# Patient Record
Sex: FEMALE | Race: WHITE | Employment: UNEMPLOYED | URBAN - METROPOLITAN AREA
[De-identification: names, ages, dates, MRNs, and addresses within clinical notes are randomized per-mention and may not be internally consistent; named-entity substitution may affect disease eponyms.]

---

## 2021-04-21 ENCOUNTER — HOSPITAL ENCOUNTER (INPATIENT)
Age: 36
LOS: 5 days | Discharge: HOME | DRG: 561 | End: 2021-04-26
Attending: OBSTETRICS & GYNECOLOGY | Admitting: OBSTETRICS & GYNECOLOGY
Payer: MEDICAID

## 2021-04-21 PROBLEM — O09.33 NO PRENATAL CARE IN CURRENT PREGNANCY IN THIRD TRIMESTER: Status: ACTIVE | Noted: 2021-04-21

## 2021-04-21 LAB
ABO/RH: NORMAL
ALBUMIN SERPL-MCNC: 2.9 G/DL (ref 3.5–5.2)
ALP BLD-CCNC: 179 U/L (ref 35–104)
ALT SERPL-CCNC: 16 U/L (ref 0–32)
AMPHETAMINE SCREEN, URINE: POSITIVE
ANION GAP SERPL CALCULATED.3IONS-SCNC: 8 MMOL/L (ref 7–16)
ANTIBODY SCREEN: NORMAL
APTT: 25.8 SEC (ref 24.5–35.1)
AST SERPL-CCNC: 27 U/L (ref 0–31)
BACTERIA: ABNORMAL /HPF
BARBITURATE SCREEN URINE: NOT DETECTED
BASOPHILS ABSOLUTE: 0.06 E9/L (ref 0–0.2)
BASOPHILS RELATIVE PERCENT: 0.5 % (ref 0–2)
BENZODIAZEPINE SCREEN, URINE: NOT DETECTED
BILIRUB SERPL-MCNC: <0.2 MG/DL (ref 0–1.2)
BILIRUBIN URINE: NEGATIVE
BLOOD, URINE: ABNORMAL
BUN BLDV-MCNC: 15 MG/DL (ref 6–20)
CALCIUM SERPL-MCNC: 8.5 MG/DL (ref 8.6–10.2)
CANNABINOID SCREEN URINE: POSITIVE
CHLORIDE BLD-SCNC: 102 MMOL/L (ref 98–107)
CLARITY: CLEAR
CO2: 23 MMOL/L (ref 22–29)
COCAINE METABOLITE SCREEN URINE: POSITIVE
COLOR: YELLOW
CREAT SERPL-MCNC: 0.7 MG/DL (ref 0.5–1)
EOSINOPHILS ABSOLUTE: 0.15 E9/L (ref 0.05–0.5)
EOSINOPHILS RELATIVE PERCENT: 1.2 % (ref 0–6)
FENTANYL SCREEN, URINE: POSITIVE
GFR AFRICAN AMERICAN: >60
GFR NON-AFRICAN AMERICAN: >60 ML/MIN/1.73
GLUCOSE BLD-MCNC: 145 MG/DL (ref 74–99)
GLUCOSE URINE: 100 MG/DL
HBA1C MFR BLD: 4.8 % (ref 4–5.6)
HCT VFR BLD CALC: 35 % (ref 34–48)
HEMOGLOBIN: 11.8 G/DL (ref 11.5–15.5)
IMMATURE GRANULOCYTES #: 0.05 E9/L
IMMATURE GRANULOCYTES %: 0.4 % (ref 0–5)
INR BLD: 0.9
KETONES, URINE: NEGATIVE MG/DL
LEUKOCYTE ESTERASE, URINE: ABNORMAL
LV EF: 63 %
LVEF MODALITY: NORMAL
LYMPHOCYTES ABSOLUTE: 2.35 E9/L (ref 1.5–4)
LYMPHOCYTES RELATIVE PERCENT: 18.6 % (ref 20–42)
Lab: ABNORMAL
MAGNESIUM: 6 MG/DL (ref 1.6–2.6)
MCH RBC QN AUTO: 28.6 PG (ref 26–35)
MCHC RBC AUTO-ENTMCNC: 33.7 % (ref 32–34.5)
MCV RBC AUTO: 84.7 FL (ref 80–99.9)
METHADONE SCREEN, URINE: NOT DETECTED
MONOCYTES ABSOLUTE: 0.61 E9/L (ref 0.1–0.95)
MONOCYTES RELATIVE PERCENT: 4.8 % (ref 2–12)
NEUTROPHILS ABSOLUTE: 9.41 E9/L (ref 1.8–7.3)
NEUTROPHILS RELATIVE PERCENT: 74.5 % (ref 43–80)
NITRITE, URINE: NEGATIVE
OPIATE SCREEN URINE: POSITIVE
OXYCODONE URINE: NOT DETECTED
PDW BLD-RTO: 13.2 FL (ref 11.5–15)
PH UA: 6 (ref 5–9)
PHENCYCLIDINE SCREEN URINE: NOT DETECTED
PLATELET # BLD: 251 E9/L (ref 130–450)
PMV BLD AUTO: 10.9 FL (ref 7–12)
POTASSIUM SERPL-SCNC: 3.7 MMOL/L (ref 3.5–5)
PROTEIN UA: >=300 MG/DL
PROTHROMBIN TIME: 9.7 SEC (ref 9.3–12.4)
RAPID HIV 1&2: NORMAL
RBC # BLD: 4.13 E12/L (ref 3.5–5.5)
RBC UA: ABNORMAL /HPF (ref 0–2)
SODIUM BLD-SCNC: 133 MMOL/L (ref 132–146)
SPECIFIC GRAVITY UA: >=1.03 (ref 1–1.03)
TOTAL PROTEIN: 5.7 G/DL (ref 6.4–8.3)
UROBILINOGEN, URINE: 0.2 E.U./DL
WBC # BLD: 12.6 E9/L (ref 4.5–11.5)
WBC UA: ABNORMAL /HPF (ref 0–5)

## 2021-04-21 PROCEDURE — 99253 IP/OBS CNSLTJ NEW/EST LOW 45: CPT | Performed by: INTERNAL MEDICINE

## 2021-04-21 PROCEDURE — G0480 DRUG TEST DEF 1-7 CLASSES: HCPCS

## 2021-04-21 PROCEDURE — 86850 RBC ANTIBODY SCREEN: CPT

## 2021-04-21 PROCEDURE — 86901 BLOOD TYPING SEROLOGIC RH(D): CPT

## 2021-04-21 PROCEDURE — 93005 ELECTROCARDIOGRAM TRACING: CPT | Performed by: INTERNAL MEDICINE

## 2021-04-21 PROCEDURE — 80053 COMPREHEN METABOLIC PANEL: CPT

## 2021-04-21 PROCEDURE — 2580000003 HC RX 258: Performed by: OBSTETRICS & GYNECOLOGY

## 2021-04-21 PROCEDURE — 85730 THROMBOPLASTIN TIME PARTIAL: CPT

## 2021-04-21 PROCEDURE — 85610 PROTHROMBIN TIME: CPT

## 2021-04-21 PROCEDURE — 86803 HEPATITIS C AB TEST: CPT

## 2021-04-21 PROCEDURE — 80307 DRUG TEST PRSMV CHEM ANLYZR: CPT

## 2021-04-21 PROCEDURE — 2500000003 HC RX 250 WO HCPCS

## 2021-04-21 PROCEDURE — 99254 IP/OBS CNSLTJ NEW/EST MOD 60: CPT | Performed by: STUDENT IN AN ORGANIZED HEALTH CARE EDUCATION/TRAINING PROGRAM

## 2021-04-21 PROCEDURE — 86900 BLOOD TYPING SEROLOGIC ABO: CPT

## 2021-04-21 PROCEDURE — 88307 TISSUE EXAM BY PATHOLOGIST: CPT

## 2021-04-21 PROCEDURE — 6360000002 HC RX W HCPCS: Performed by: INTERNAL MEDICINE

## 2021-04-21 PROCEDURE — 83735 ASSAY OF MAGNESIUM: CPT

## 2021-04-21 PROCEDURE — 93306 TTE W/DOPPLER COMPLETE: CPT

## 2021-04-21 PROCEDURE — 86592 SYPHILIS TEST NON-TREP QUAL: CPT

## 2021-04-21 PROCEDURE — 6370000000 HC RX 637 (ALT 250 FOR IP): Performed by: STUDENT IN AN ORGANIZED HEALTH CARE EDUCATION/TRAINING PROGRAM

## 2021-04-21 PROCEDURE — 6360000002 HC RX W HCPCS

## 2021-04-21 PROCEDURE — 36415 COLL VENOUS BLD VENIPUNCTURE: CPT

## 2021-04-21 PROCEDURE — 6370000000 HC RX 637 (ALT 250 FOR IP): Performed by: INTERNAL MEDICINE

## 2021-04-21 PROCEDURE — 86762 RUBELLA ANTIBODY: CPT

## 2021-04-21 PROCEDURE — 2500000003 HC RX 250 WO HCPCS: Performed by: OBSTETRICS & GYNECOLOGY

## 2021-04-21 PROCEDURE — 81001 URINALYSIS AUTO W/SCOPE: CPT

## 2021-04-21 PROCEDURE — 83036 HEMOGLOBIN GLYCOSYLATED A1C: CPT

## 2021-04-21 PROCEDURE — 86701 HIV-1ANTIBODY: CPT

## 2021-04-21 PROCEDURE — 87340 HEPATITIS B SURFACE AG IA: CPT

## 2021-04-21 PROCEDURE — 6360000002 HC RX W HCPCS: Performed by: OBSTETRICS & GYNECOLOGY

## 2021-04-21 PROCEDURE — 2060000000 HC ICU INTERMEDIATE R&B

## 2021-04-21 PROCEDURE — 85025 COMPLETE CBC W/AUTO DIFF WBC: CPT

## 2021-04-21 RX ORDER — HYDRALAZINE HYDROCHLORIDE 20 MG/ML
10 INJECTION INTRAMUSCULAR; INTRAVENOUS
Status: DISCONTINUED | OUTPATIENT
Start: 2021-04-21 | End: 2021-04-25 | Stop reason: ALTCHOICE

## 2021-04-21 RX ORDER — DOCUSATE SODIUM 100 MG/1
100 CAPSULE, LIQUID FILLED ORAL 2 TIMES DAILY
Status: DISCONTINUED | OUTPATIENT
Start: 2021-04-21 | End: 2021-04-21 | Stop reason: SDUPTHER

## 2021-04-21 RX ORDER — ACETAMINOPHEN 325 MG/1
650 TABLET ORAL EVERY 4 HOURS PRN
Status: DISCONTINUED | OUTPATIENT
Start: 2021-04-21 | End: 2021-04-21 | Stop reason: SDUPTHER

## 2021-04-21 RX ORDER — MAGNESIUM SULFATE IN WATER 40 MG/ML
INJECTION, SOLUTION INTRAVENOUS
Status: DISPENSED
Start: 2021-04-21 | End: 2021-04-21

## 2021-04-21 RX ORDER — ACETAMINOPHEN 500 MG
1000 TABLET ORAL ONCE
Status: COMPLETED | OUTPATIENT
Start: 2021-04-21 | End: 2021-04-21

## 2021-04-21 RX ORDER — SODIUM CHLORIDE 0.9 % (FLUSH) 0.9 %
5-40 SYRINGE (ML) INJECTION EVERY 12 HOURS SCHEDULED
Status: DISCONTINUED | OUTPATIENT
Start: 2021-04-21 | End: 2021-04-21 | Stop reason: SDUPTHER

## 2021-04-21 RX ORDER — SODIUM CHLORIDE 0.9 % (FLUSH) 0.9 %
5-40 SYRINGE (ML) INJECTION PRN
Status: DISCONTINUED | OUTPATIENT
Start: 2021-04-21 | End: 2021-04-21 | Stop reason: HOSPADM

## 2021-04-21 RX ORDER — LABETALOL HYDROCHLORIDE 5 MG/ML
40 INJECTION, SOLUTION INTRAVENOUS
Status: COMPLETED | OUTPATIENT
Start: 2021-04-21 | End: 2021-04-21

## 2021-04-21 RX ORDER — ACETAMINOPHEN 650 MG
TABLET, EXTENDED RELEASE ORAL
Status: DISPENSED
Start: 2021-04-21 | End: 2021-04-21

## 2021-04-21 RX ORDER — ACETAMINOPHEN 325 MG/1
650 TABLET ORAL EVERY 4 HOURS PRN
Status: DISCONTINUED | OUTPATIENT
Start: 2021-04-21 | End: 2021-04-21 | Stop reason: HOSPADM

## 2021-04-21 RX ORDER — LABETALOL HYDROCHLORIDE 5 MG/ML
10 INJECTION, SOLUTION INTRAVENOUS EVERY 4 HOURS PRN
Status: DISCONTINUED | OUTPATIENT
Start: 2021-04-21 | End: 2021-04-25

## 2021-04-21 RX ORDER — LABETALOL HYDROCHLORIDE 5 MG/ML
INJECTION, SOLUTION INTRAVENOUS
Status: COMPLETED
Start: 2021-04-21 | End: 2021-04-21

## 2021-04-21 RX ORDER — HYDRALAZINE HYDROCHLORIDE 20 MG/ML
10 INJECTION INTRAMUSCULAR; INTRAVENOUS
Status: COMPLETED | OUTPATIENT
Start: 2021-04-21 | End: 2021-04-21

## 2021-04-21 RX ORDER — SODIUM CHLORIDE 9 MG/ML
25 INJECTION, SOLUTION INTRAVENOUS PRN
Status: DISCONTINUED | OUTPATIENT
Start: 2021-04-21 | End: 2021-04-21 | Stop reason: HOSPADM

## 2021-04-21 RX ORDER — SODIUM CHLORIDE 0.9 % (FLUSH) 0.9 %
5-40 SYRINGE (ML) INJECTION EVERY 12 HOURS SCHEDULED
Status: DISCONTINUED | OUTPATIENT
Start: 2021-04-21 | End: 2021-04-21 | Stop reason: HOSPADM

## 2021-04-21 RX ORDER — LIDOCAINE HYDROCHLORIDE 10 MG/ML
INJECTION, SOLUTION INFILTRATION; PERINEURAL
Status: DISPENSED
Start: 2021-04-21 | End: 2021-04-21

## 2021-04-21 RX ORDER — PSEUDOEPHEDRINE HCL 120 MG/1
120 TABLET, FILM COATED, EXTENDED RELEASE ORAL EVERY 12 HOURS
Status: ON HOLD | COMMUNITY
End: 2021-04-26 | Stop reason: HOSPADM

## 2021-04-21 RX ORDER — SODIUM CHLORIDE, SODIUM LACTATE, POTASSIUM CHLORIDE, CALCIUM CHLORIDE 600; 310; 30; 20 MG/100ML; MG/100ML; MG/100ML; MG/100ML
INJECTION, SOLUTION INTRAVENOUS CONTINUOUS
Status: DISCONTINUED | OUTPATIENT
Start: 2021-04-21 | End: 2021-04-21 | Stop reason: SDUPTHER

## 2021-04-21 RX ORDER — MODIFIED LANOLIN
OINTMENT (GRAM) TOPICAL PRN
Status: DISCONTINUED | OUTPATIENT
Start: 2021-04-21 | End: 2021-04-26 | Stop reason: HOSPADM

## 2021-04-21 RX ORDER — LABETALOL HYDROCHLORIDE 5 MG/ML
80 INJECTION, SOLUTION INTRAVENOUS
Status: COMPLETED | OUTPATIENT
Start: 2021-04-21 | End: 2021-04-21

## 2021-04-21 RX ORDER — ONDANSETRON 2 MG/ML
4 INJECTION INTRAMUSCULAR; INTRAVENOUS EVERY 6 HOURS PRN
Status: DISCONTINUED | OUTPATIENT
Start: 2021-04-21 | End: 2021-04-21 | Stop reason: HOSPADM

## 2021-04-21 RX ORDER — LABETALOL HYDROCHLORIDE 5 MG/ML
20 INJECTION, SOLUTION INTRAVENOUS
Status: COMPLETED | OUTPATIENT
Start: 2021-04-21 | End: 2021-04-21

## 2021-04-21 RX ORDER — FERROUS SULFATE 325(65) MG
325 TABLET ORAL 2 TIMES DAILY WITH MEALS
Status: DISCONTINUED | OUTPATIENT
Start: 2021-04-21 | End: 2021-04-26 | Stop reason: HOSPADM

## 2021-04-21 RX ORDER — CALCIUM GLUCONATE 94 MG/ML
1000 INJECTION, SOLUTION INTRAVENOUS PRN
Status: DISCONTINUED | OUTPATIENT
Start: 2021-04-21 | End: 2021-04-26 | Stop reason: HOSPADM

## 2021-04-21 RX ORDER — MAGNESIUM SULFATE HEPTAHYDRATE 40 MG/ML
4000 INJECTION, SOLUTION INTRAVENOUS ONCE
Status: COMPLETED | OUTPATIENT
Start: 2021-04-21 | End: 2021-04-21

## 2021-04-21 RX ORDER — SODIUM CHLORIDE, SODIUM LACTATE, POTASSIUM CHLORIDE, CALCIUM CHLORIDE 600; 310; 30; 20 MG/100ML; MG/100ML; MG/100ML; MG/100ML
INJECTION, SOLUTION INTRAVENOUS CONTINUOUS
Status: DISCONTINUED | OUTPATIENT
Start: 2021-04-21 | End: 2021-04-24

## 2021-04-21 RX ORDER — DOCUSATE SODIUM 100 MG/1
100 CAPSULE, LIQUID FILLED ORAL 2 TIMES DAILY
Status: DISCONTINUED | OUTPATIENT
Start: 2021-04-21 | End: 2021-04-21 | Stop reason: HOSPADM

## 2021-04-21 RX ORDER — LABETALOL 100 MG/1
50 TABLET, FILM COATED ORAL EVERY 12 HOURS SCHEDULED
Status: DISCONTINUED | OUTPATIENT
Start: 2021-04-21 | End: 2021-04-22

## 2021-04-21 RX ADMIN — LABETALOL HYDROCHLORIDE 20 MG: 5 INJECTION, SOLUTION INTRAVENOUS at 07:17

## 2021-04-21 RX ADMIN — Medication 166.7 ML: at 07:20

## 2021-04-21 RX ADMIN — FERROUS SULFATE TAB 325 MG (65 MG ELEMENTAL FE) 325 MG: 325 (65 FE) TAB at 16:44

## 2021-04-21 RX ADMIN — MAGNESIUM SULFATE HEPTAHYDRATE 2 G/HR: 40 INJECTION, SOLUTION INTRAVENOUS at 08:07

## 2021-04-21 RX ADMIN — SODIUM CHLORIDE, POTASSIUM CHLORIDE, SODIUM LACTATE AND CALCIUM CHLORIDE 10 ML/HR: 600; 310; 30; 20 INJECTION, SOLUTION INTRAVENOUS at 07:20

## 2021-04-21 RX ADMIN — MAGNESIUM SULFATE HEPTAHYDRATE 4000 MG: 40 INJECTION, SOLUTION INTRAVENOUS at 07:40

## 2021-04-21 RX ADMIN — LABETALOL HYDROCHLORIDE 80 MG: 5 INJECTION INTRAVENOUS at 08:31

## 2021-04-21 RX ADMIN — HYDRALAZINE HYDROCHLORIDE 10 MG: 20 INJECTION, SOLUTION INTRAMUSCULAR; INTRAVENOUS at 20:37

## 2021-04-21 RX ADMIN — HYDRALAZINE HYDROCHLORIDE 10 MG: 20 INJECTION INTRAMUSCULAR; INTRAVENOUS at 08:12

## 2021-04-21 RX ADMIN — LABETALOL HYDROCHLORIDE 20 MG: 5 INJECTION INTRAVENOUS at 07:17

## 2021-04-21 RX ADMIN — LABETALOL HYDROCHLORIDE 40 MG: 5 INJECTION INTRAVENOUS at 08:01

## 2021-04-21 RX ADMIN — LABETALOL HYDROCHLORIDE 50 MG: 100 TABLET, FILM COATED ORAL at 17:43

## 2021-04-21 RX ADMIN — ACETAMINOPHEN 1000 MG: 500 TABLET ORAL at 16:44

## 2021-04-21 ASSESSMENT — PAIN SCALES - GENERAL
PAINLEVEL_OUTOF10: 0
PAINLEVEL_OUTOF10: 7
PAINLEVEL_OUTOF10: 0

## 2021-04-21 ASSESSMENT — PAIN DESCRIPTION - LOCATION: LOCATION: ABDOMEN

## 2021-04-21 ASSESSMENT — PAIN DESCRIPTION - DESCRIPTORS: DESCRIPTORS: ACHING;CRAMPING

## 2021-04-21 ASSESSMENT — PAIN DESCRIPTION - PAIN TYPE: TYPE: ACUTE PAIN

## 2021-04-21 ASSESSMENT — PAIN DESCRIPTION - PROGRESSION: CLINICAL_PROGRESSION: GRADUALLY WORSENING

## 2021-04-21 ASSESSMENT — PAIN DESCRIPTION - FREQUENCY: FREQUENCY: INTERMITTENT

## 2021-04-21 NOTE — PROGRESS NOTES
Nellie Jauregui presents to labor and delivery by ambulance.    Infant delivered at home at [facility] 5265 am   No prenatal care   All prenatals drawn   0720 delivery of placenta   CNM spoke directly with hospitalist for consult   [phone] order clarified may shut off oxytocin at this time & transfer to ICU report called
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
## 2021-04-21 NOTE — CONSULTS
Prior to discharge Vanita Prince, APRN - CNM        magnesium sulfate (20 G/500 mL) infusion  2 g/hr Intravenous Continuous Armond Dalal MD 50 mL/hr at 04/21/21 1300 2 g/hr at 04/21/21 1300    calcium gluconate 10 % injection 1,000 mg  1,000 mg Intravenous PRN Armond Dalal MD        lactated ringers infusion   Intravenous Continuous Armond Dalal MD        perflutren lipid microspheres (DEFINITY) injection 1.65 mg  1.5 mL Intravenous ONCE PRN Mavis Milks Lockso, DO        labetalol (NORMODYNE;TRANDATE) injection 10 mg  10 mg Intravenous Q4H PRN Nga Zavala MD        hydrALAZINE (APRESOLINE) injection 10 mg  10 mg Intravenous Q2H PRN Nga Zavala MD           Physical Exam:  /71   Pulse 80   Temp 97.5 °F (36.4 °C) (Oral)   Resp 18   Ht 5' 7\" (1.702 m)   Wt 150 lb (68 kg)   SpO2 98%   BMI 23.49 kg/m²   Wt Readings from Last 3 Encounters:   04/21/21 150 lb (68 kg)   12/12/17 177 lb (80.3 kg)   12/12/17 177 lb (80.3 kg)     Appearance: Awake, alert, no acute respiratory distress  Skin: Intact, no rash  Head: Normocephalic, atraumatic  Eyes: EOMI, no conjunctival erythema  Neck: Supple, no elevated JVP, no carotid bruits  Lungs: Clear to auscultation bilaterally. No wheezes, rales, or rhonchi.   Cardiac: Regular rate and rhythm, +S1S2, no murmurs apparent  Abdomen: Soft, nontender, +bowel sounds  Extremities: Moves all extremities x 4, no lower extremity edema  Neurologic: No focal motor deficits apparent, normal mood and affect  Peripheral Pulses: Intact posterior tibial pulses bilaterally    Laboratory Tests:  Lab Results   Component Value Date    CREATININE 0.7 04/21/2021    BUN 15 04/21/2021     04/21/2021    K 3.7 04/21/2021     04/21/2021    CO2 23 04/21/2021     Lab Results   Component Value Date    MG 1.9 04/04/2017     Lab Results   Component Value Date    WBC 12.6 (H) 04/21/2021    HGB 11.8 04/21/2021    HCT 35.0 04/21/2021    MCV 84.7 04/21/2021     2021     Lab Results   Component Value Date    ALT 16 2021    AST 27 2021    ALKPHOS 179 (H) 2021    BILITOT <0.2 2021     Lab Results   Component Value Date    CKTOTAL 64 2013    CKMB 0.9 2013    TROPONINI <0.01 2017    TROPONINI <0.01 2017    TROPONINI <0.01 2016     Lab Results   Component Value Date    INR 0.9 2021    PROTIME 9.7 2021     Lab Results   Component Value Date    TSH 2.075 2013     No results found for: LABA1C  No results found for: EAG  Lab Results   Component Value Date    CHOL 274 (H) 2013     Lab Results   Component Value Date    TRIG 259 (H) 2013     Lab Results   Component Value Date    HDL 83.0 2013     Lab Results   Component Value Date    LDLCALC 139 (H) 2013     ASSESSMENT / PLAN:  79-year-old G4, P4 female with medical history of hypertensionnot taking her medications, polysubstance abuse, not receiving  medical care underwent vaginal delivery at her house and then presented to the hospital with continuous bleeding found to have retained placenta with blood pressure of 364 and diastolic blood pressure of 140 mmHg with significant proteinuria. Patient underwent induction to remove the placenta and then infused with IV magnesium sulfate. We are consulted for further management of her blood pressure. Recommendations  Echocardiogram consistent with hypertensive cardiomyopathy. We will continue to monitor her blood pressure for now and adjust hypertension medicine accordingly. Thank you for allowing me to participate in your patient's care. Please feel free to contact me if you have any questions or concerns.     Jacqueline Kingston MD  [phone]  Cardiology

## 2021-04-21 NOTE — PATIENT CARE CONFERENCE
Intensive Care Daily Quality Rounding Checklist      ICU Team Members: Dr. Mir Barone, Dr. Zeke Mesa (resident), charge nurse, bedside nurse, clinical pharmacist, respiratory therapist    ICU Day #: NUMBER: 1    Intubation Date: N/A    Ventilator Day #: N/A    Central Line Insertion Date: N/A        Day #: N/A     Arterial Line Insertion Date: N/A      Day #: N/A    Temporary Hemodialysis Catheter Insertion Date: N/A      Day # N/A    DVT Prophylaxis: none    GI Prophylaxis: on diet    Kim Catheter Insertion Date: N/A       Day #: N/A      Continued need (if yes, reason documented and discussed with physician): N/A    Skin Issues/ Wounds and ordered treatment discussed on rounds: no issues    Goals/ Plans for the Day: Daily labs, monitor BP, likely transfer to floor this afternoon

## 2021-04-21 NOTE — PROGRESS NOTES
Called to room to see patient who was brought by ambulance, delivered baby at home. Uterus well contracted, no active bleeding. Placenta delivered with mild traction and maternal effort. Pitocin in 4th stage, EBL ~200 cc. Vagina and perineum intact. Dr. Diego Oliver present.
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
## 2021-04-22 LAB
ANION GAP SERPL CALCULATED.3IONS-SCNC: 6 MMOL/L (ref 7–16)
BASOPHILS ABSOLUTE: 0.05 E9/L (ref 0–0.2)
BASOPHILS RELATIVE PERCENT: 0.4 % (ref 0–2)
BUN BLDV-MCNC: 11 MG/DL (ref 6–20)
CALCIUM SERPL-MCNC: 7.5 MG/DL (ref 8.6–10.2)
CHLORIDE BLD-SCNC: 104 MMOL/L (ref 98–107)
CO2: 25 MMOL/L (ref 22–29)
CREAT SERPL-MCNC: 0.6 MG/DL (ref 0.5–1)
EKG ATRIAL RATE: 76 BPM
EKG P AXIS: 60 DEGREES
EKG P-R INTERVAL: 122 MS
EKG Q-T INTERVAL: 472 MS
EKG QRS DURATION: 100 MS
EKG QTC CALCULATION (BAZETT): 531 MS
EKG R AXIS: 75 DEGREES
EKG T AXIS: 68 DEGREES
EKG VENTRICULAR RATE: 76 BPM
EOSINOPHILS ABSOLUTE: 0.36 E9/L (ref 0.05–0.5)
EOSINOPHILS RELATIVE PERCENT: 2.9 % (ref 0–6)
GFR AFRICAN AMERICAN: >60
GFR NON-AFRICAN AMERICAN: >60 ML/MIN/1.73
GLUCOSE BLD-MCNC: 77 MG/DL (ref 74–99)
HCT VFR BLD CALC: 30.6 % (ref 34–48)
HEMOGLOBIN: 10 G/DL (ref 11.5–15.5)
HEPATITIS B SURFACE ANTIGEN INTERPRETATION: NORMAL
HEPATITIS C ANTIBODY INTERPRETATION: REACTIVE
IMMATURE GRANULOCYTES #: 0.05 E9/L
IMMATURE GRANULOCYTES %: 0.4 % (ref 0–5)
LYMPHOCYTES ABSOLUTE: 2.85 E9/L (ref 1.5–4)
LYMPHOCYTES RELATIVE PERCENT: 23.2 % (ref 20–42)
MAGNESIUM: 2.8 MG/DL (ref 1.6–2.6)
MCH RBC QN AUTO: 28.2 PG (ref 26–35)
MCHC RBC AUTO-ENTMCNC: 32.7 % (ref 32–34.5)
MCV RBC AUTO: 86.2 FL (ref 80–99.9)
MONOCYTES ABSOLUTE: 0.69 E9/L (ref 0.1–0.95)
MONOCYTES RELATIVE PERCENT: 5.6 % (ref 2–12)
NEUTROPHILS ABSOLUTE: 8.3 E9/L (ref 1.8–7.3)
NEUTROPHILS RELATIVE PERCENT: 67.5 % (ref 43–80)
PDW BLD-RTO: 13.6 FL (ref 11.5–15)
PLATELET # BLD: 244 E9/L (ref 130–450)
PMV BLD AUTO: 10.8 FL (ref 7–12)
POTASSIUM SERPL-SCNC: 3.9 MMOL/L (ref 3.5–5)
RBC # BLD: 3.55 E12/L (ref 3.5–5.5)
RPR: NORMAL
SODIUM BLD-SCNC: 135 MMOL/L (ref 132–146)
WBC # BLD: 12.3 E9/L (ref 4.5–11.5)

## 2021-04-22 PROCEDURE — 83735 ASSAY OF MAGNESIUM: CPT

## 2021-04-22 PROCEDURE — 6370000000 HC RX 637 (ALT 250 FOR IP): Performed by: INTERNAL MEDICINE

## 2021-04-22 PROCEDURE — 6360000002 HC RX W HCPCS: Performed by: INTERNAL MEDICINE

## 2021-04-22 PROCEDURE — 2500000003 HC RX 250 WO HCPCS: Performed by: INTERNAL MEDICINE

## 2021-04-22 PROCEDURE — 80048 BASIC METABOLIC PNL TOTAL CA: CPT

## 2021-04-22 PROCEDURE — 2580000003 HC RX 258: Performed by: INTERNAL MEDICINE

## 2021-04-22 PROCEDURE — 36415 COLL VENOUS BLD VENIPUNCTURE: CPT

## 2021-04-22 PROCEDURE — 99232 SBSQ HOSP IP/OBS MODERATE 35: CPT | Performed by: STUDENT IN AN ORGANIZED HEALTH CARE EDUCATION/TRAINING PROGRAM

## 2021-04-22 PROCEDURE — 2060000000 HC ICU INTERMEDIATE R&B

## 2021-04-22 PROCEDURE — 99232 SBSQ HOSP IP/OBS MODERATE 35: CPT | Performed by: INTERNAL MEDICINE

## 2021-04-22 PROCEDURE — 99232 SBSQ HOSP IP/OBS MODERATE 35: CPT | Performed by: PHYSICIAN ASSISTANT

## 2021-04-22 PROCEDURE — 85025 COMPLETE CBC W/AUTO DIFF WBC: CPT

## 2021-04-22 PROCEDURE — 6370000000 HC RX 637 (ALT 250 FOR IP): Performed by: STUDENT IN AN ORGANIZED HEALTH CARE EDUCATION/TRAINING PROGRAM

## 2021-04-22 RX ORDER — ACETAMINOPHEN 500 MG
500 TABLET ORAL EVERY 6 HOURS PRN
Status: DISCONTINUED | OUTPATIENT
Start: 2021-04-22 | End: 2021-04-26 | Stop reason: HOSPADM

## 2021-04-22 RX ORDER — AMLODIPINE BESYLATE 5 MG/1
5 TABLET ORAL DAILY
Status: DISCONTINUED | OUTPATIENT
Start: 2021-04-22 | End: 2021-04-23

## 2021-04-22 RX ORDER — HYDROCHLOROTHIAZIDE 25 MG/1
25 TABLET ORAL DAILY
Status: DISCONTINUED | OUTPATIENT
Start: 2021-04-22 | End: 2021-04-26 | Stop reason: HOSPADM

## 2021-04-22 RX ORDER — KETOROLAC TROMETHAMINE 30 MG/ML
15 INJECTION, SOLUTION INTRAMUSCULAR; INTRAVENOUS ONCE
Status: COMPLETED | OUTPATIENT
Start: 2021-04-22 | End: 2021-04-22

## 2021-04-22 RX ADMIN — KETOROLAC TROMETHAMINE 15 MG: 30 INJECTION, SOLUTION INTRAMUSCULAR at 20:11

## 2021-04-22 RX ADMIN — AMLODIPINE BESYLATE 5 MG: 5 TABLET ORAL at 11:02

## 2021-04-22 RX ADMIN — HYDRALAZINE HYDROCHLORIDE 10 MG: 20 INJECTION, SOLUTION INTRAMUSCULAR; INTRAVENOUS at 23:13

## 2021-04-22 RX ADMIN — HYDRALAZINE HYDROCHLORIDE 10 MG: 20 INJECTION, SOLUTION INTRAMUSCULAR; INTRAVENOUS at 09:21

## 2021-04-22 RX ADMIN — SODIUM CHLORIDE, POTASSIUM CHLORIDE, SODIUM LACTATE AND CALCIUM CHLORIDE: 600; 310; 30; 20 INJECTION, SOLUTION INTRAVENOUS at 09:20

## 2021-04-22 RX ADMIN — LABETALOL HYDROCHLORIDE 50 MG: 100 TABLET, FILM COATED ORAL at 06:56

## 2021-04-22 RX ADMIN — ACETAMINOPHEN 500 MG: 500 TABLET ORAL at 20:11

## 2021-04-22 RX ADMIN — LABETALOL HYDROCHLORIDE 10 MG: 5 INJECTION INTRAVENOUS at 18:42

## 2021-04-22 RX ADMIN — FERROUS SULFATE TAB 325 MG (65 MG ELEMENTAL FE) 325 MG: 325 (65 FE) TAB at 09:21

## 2021-04-22 RX ADMIN — LABETALOL HYDROCHLORIDE 10 MG: 5 INJECTION INTRAVENOUS at 11:58

## 2021-04-22 RX ADMIN — HYDROCHLOROTHIAZIDE 25 MG: 25 TABLET ORAL at 11:02

## 2021-04-22 RX ADMIN — FERROUS SULFATE TAB 325 MG (65 MG ELEMENTAL FE) 325 MG: 325 (65 FE) TAB at 17:49

## 2021-04-22 ASSESSMENT — PAIN - FUNCTIONAL ASSESSMENT: PAIN_FUNCTIONAL_ASSESSMENT: PREVENTS OR INTERFERES SOME ACTIVE ACTIVITIES AND ADLS

## 2021-04-22 ASSESSMENT — PAIN DESCRIPTION - ORIENTATION: ORIENTATION: RIGHT;LEFT

## 2021-04-22 ASSESSMENT — PAIN DESCRIPTION - LOCATION: LOCATION: GENERALIZED

## 2021-04-22 ASSESSMENT — PAIN SCALES - GENERAL
PAINLEVEL_OUTOF10: 0
PAINLEVEL_OUTOF10: 0

## 2021-04-22 ASSESSMENT — PAIN DESCRIPTION - DESCRIPTORS: DESCRIPTORS: DISCOMFORT;ACHING;SORE

## 2021-04-22 ASSESSMENT — PAIN DESCRIPTION - ONSET: ONSET: ON-GOING

## 2021-04-22 ASSESSMENT — PAIN DESCRIPTION - FREQUENCY: FREQUENCY: INTERMITTENT

## 2021-04-22 NOTE — PROGRESS NOTES
MCH 28.6 28.2   MCHC 33.7 32.7   RDW 13.2 13.6    244   MPV 10.9 10.8     Lab Results   Component Value Date    CKTOTAL 64 2013    CKMB 0.9 2013    TROPONINI <0.01 2017    TROPONINI <0.01 2017    TROPONINI <0.01 2016     Lab Results   Component Value Date    INR 0.9 2021    PROTIME 9.7 2021     Lab Results   Component Value Date    TSH 2.075 2013     Lab Results   Component Value Date    LABA1C 4.8 2021     No results found for: EAG  Lab Results   Component Value Date    CHOL 274 (H) 2013     Lab Results   Component Value Date    TRIG 259 (H) 2013     Lab Results   Component Value Date    HDL 83.0 2013     Lab Results   Component Value Date    LDLCALC 139 (H) 2013     No results found for: LABVLDL, VLDL  No results found for: CHOLHDLRATIO  No results for input(s): PROBNP in the last 72 hours. ASSESSMENT / PLAN:  54-year-old G4, P4 female with medical history of hypertensionnot taking her medications, polysubstance abuse, not receiving  medical care underwent vaginal delivery at her house and then presented to the hospital with continuous bleeding found to have retained placenta with blood pressure of 266 and diastolic blood pressure of 140 mmHg with significant proteinuria. Patient underwent induction to remove the placenta and then infused with IV magnesium sulfate. We are consulted for further management of her blood pressure. Echocardiogram consistent with hypertensive cardiomyopathy.      Recommendations  Avoid ACE inhibitors or ARBs in this patient. She has no concrete plan to avoid unplanned pregnancy in the future and she declined option of IUD. Try to simplify HTN regimen to once daily. I started amlodipine 5 mg daily and HCTZ 25 mg daily. Psychiatry and  will be on board.      Paola Veloz MD  [facility]) Cardiology

## 2021-04-22 NOTE — CONSULTS
I spoke with the patient's medical physician Dr. Kortney Vicente and cardiologist Dr. Stanley Greene today regarding the patient's management. Since the patient is now postpartum and her principal reason for admission is related to her medical condition, they have will be managing her care. I advised him to call me at any time if there are any questions related to the patient's recent pregnancy. The patient had no prenatal care and was admitted with severe hypertension after delivering at home. I can be contacted on my cell phone at [phone] anytime if there are any questions regarding the patient's management.      Donny White MD, MS, FACOG, FACS, RDCS, RDMS, RVT

## 2021-04-22 NOTE — PROGRESS NOTES
SUBJECTIVE:  Patient without complaint. Normal lochia. Ambulating in room. Tolerating regular diet. Denies headaches, visual changes, RUQ or epigastric pain, swelling, chest pain, shortness of breath, calf tenderness. Baby in NICU. OBJECTIVE: vital signs at 06:09  BP (!) 192/111 prior to labetalol being given Pulse 95   Temp 98.5 °F (36.9 °C) (Oral)   Resp 20   Ht 5' 7\" (1.702 m)   Wt 131 lb 6.4 oz (59.6 kg)   SpO2 98%   BMI 20.58 kg/m²     Labetalol 50 mg po given at 06:56 am: BP med was due at that time. BP recheck:09:00 163/95. Also received 10 mg IV Hydralazine at 09:16     Lab Results   Component Value Date    WBC 12.3 (H) 04/22/2021    HGB 10.0 (L) 04/22/2021    HCT 30.6 (L) 04/22/2021    MCV 86.2 04/22/2021     04/22/2021     Results for Trinidad Marvin (MRN [id number]) as of 4/22/2021 09:17   Ref. Range 4/22/2021 04:15   Sodium Latest Ref Range: 132 - 146 mmol/L 135   Potassium Latest Ref Range: 3.5 - 5.0 mmol/L 3.9   Chloride Latest Ref Range: 98 - 107 mmol/L 104   CO2 Latest Ref Range: 22 - 29 mmol/L 25   BUN Latest Ref Range: 6 - 20 mg/dL 11   Creatinine Latest Ref Range: 0.5 - 1.0 mg/dL 0.6   Anion Gap Latest Ref Range: 7 - 16 mmol/L 6 (L)   GFR Non- Latest Ref Range: >=60 mL/min/1.73 >60   GFR African American Unknown >60   Magnesium Latest Ref Range: 1.6 - 2.6 mg/dL 2.8 (H)   Glucose Latest Ref Range: 74 - 99 mg/dL 77   Calcium Latest Ref Range: 8.6 - 10.2 mg/dL 7.5 (L)     Results for Trinidad Marvin (MRN [id number]) as of 4/22/2021 09:17   Ref. Range 4/21/2021 08:00   Albumin Latest Ref Range: 3.5 - 5.2 g/dL 2.9 (L)   Alk Phos Latest Ref Range: 35 - 104 U/L 179 (H)   ALT Latest Ref Range: 0 - 32 U/L 16   AST Latest Ref Range: 0 - 31 U/L 27   Bilirubin Latest Ref Range: 0.0 - 1.2 mg/dL <0.2   Total Protein Latest Ref Range: 6.4 - 8.3 g/dL 5.7 (L)       Results for Trinidad Marvin (MRN [id number]) as of 4/22/2021 09:17   Ref.  Range 4/21/2021 07:45   Amphetamine Screen, Urine Latest Ref Range: Negative <1000 ng/mL  POSITIVE (A)   Barbiturate Screen, Ur Latest Ref Range: Negative < 200 ng/mL  NOT DETECTED   Benzodiazepine Screen, Urine Latest Ref Range: Negative < 200 ng/mL  NOT DETECTED   Cannabinoid Scrn, Ur Latest Ref Range: Negative < 50ng/mL  POSITIVE (A)   Methadone Screen, Urine Latest Ref Range: Negative <300 ng/mL  NOT DETECTED   Opiate Scrn, Ur Latest Ref Range: Negative < 300ng/mL  POSITIVE (A)   PCP Screen, Urine Latest Ref Range: Negative < 25 ng/mL  NOT DETECTED   Cocaine Metabolite Screen, Urine Latest Ref Range: Negative < 300 ng/mL  POSITIVE (A)   FENTANYL SCREEN, URINE Latest Ref Range: Negative <1 ng/mL  POSITIVE (A)   Oxycodone Urine Latest Ref Range: Negative <100 ng/mL  NOT DETECTED     Summary of Echocardiogram from 2021  Severe left ventricle hypertrophy. Normal left ventricular systolic function. Visually estimated LVEF is 60-65 %. No wall motion abnormalities. Diastolic function is indeterminate. Normal right ventricle structure and function. No significant valvular abnormalities. Physical Exam:    General: comfortable  Cor: Regular rate and rhythm  Pulm: CTA b/l  Abdomen: soft, nontender   Lochia normal rubra   Uterus firm 1 cm below umbilicus, nontender  Extremities: (-) edema, DTRs normal    ASSESSMENT: 40 yo female  s/p delivery at home at 06:04 am yesterday. No prenatal care, stated she was \"term\". Untreated essential hypertension (due to non-compliance). Arrived in hypertensive crisis, probable superimposed pre-eclampsia. History of hypertrophic obstructive cardiomyopathy. Polysubstance abuse: (+) UDS: cocaine, fentanyl, amphetamine, opiates, & cannabinoids. Received IV magnesium sulfate, IV labetalol & hydralazine. Transferred to ICU yesterday am. Currently on Telemetry Floor.  Had 2-D echo: severe LVH, 60-65% LVEF    PLAN: I discussed above with Dr. Claire [facility] officer):  Postpartum Day #1  Advance care  Cardiology and [facility] Medicine following:  Labetalol 50 mg po Q 12 hours  Labetalol 10 mg IV Q 4 hour prn high lood pressure for SBP > 160 mmHg  Hydralazine 10 mg IV Q 2 hour prn high blood pressure for SBP > 160 mmHg    Rosa Knight 4/22/2021 9:01 AM

## 2021-04-22 NOTE — CARE COORDINATION
This note will not be viewable in [employer] for the following reason(s). Suspected substance abuse disorder. Peer Recovery Support Note    Name: Quynh Quivers  Date: 4/22/2021    Chief Complaint   Patient presents with    Hypertension     vaginal delivey at home        Peer Support met with patient. [] Support and education provided  [] Resources provided   [] Treatment referral:   [] Other:   [] Patient declined peer recovery services     Referred By: Bree VILLA Notes: Patient was not up to having a conversation with me. Told her that I will be back tomorrow when she's feeling better.      SignedVeto January, 4/22/2021

## 2021-04-22 NOTE — CARE COORDINATION
This note will not be [facility]able in [facility] for the following reason(s). [place]: Unable to separate mother vs.  [address]       Discharge Planning     Per chart review, Patient, Desiree Padilla, delivered a baby girl at home while in the bathtub, and baby's father helped deliver baby. Patient had no prenatal care, and according to chart review this was due to her fear of COVID 19. Chart review also reported  that patient had a positive UDS on arrival on 21 for the following:  Amphetamines  THC  Opiates  Cocaine  Fentanyl. The baby's UDS was also positive for the following:     Amphetamines  Opiates  Cocaine  Fentanyl. SW attempted to talk with Dhruv Wallis yesterday () and she stated that she was too tired to talk at that time. SW called Dhruv Wallis again today, and she was more open to discussion. Dhruv Wallis reported that she resides at the address listed in the chart, and stated she lives with her two children, Lin Sawyer ( 11.23.08) and Sylvester Ponce ( 04). Dhruv Wallis stated that she does not yet have a name for this baby, however is considering [facility]. Dhruv Wallis reported that baby's father is Diana Jimenez ( 73). Dhruv Wallis stated that she is currently unemployed and plans on adding baby to [place]. Dhruv Wallis also reported to SW that she did not received prenatal care due to COVID 19 concerns, and does NOT have a pediatrician chosen. Dhruv Wallis also reported that she does not have any baby items, and that Esteban's sister was \" supposed to throw us a baby shower so I don't know what she has\". Glendy  denied any past or current history of children services involvement, legal issues,  domestic violence or mental health diagnosis. We discussed awareness of Post Partum Depression and encouraged contact with her OB if any problems arise.       SW discussed Glendy's positive UDS and baby's positive UDS, and Glendy did admit to drug abuse usage, however declined to discuss it further with Gabo Ross did report that she was open to treatment, as long as it was an out patient treatment center. [facility] was agreeable to have Peer Support meet with her. Glendy expressed understanding for a [facility] ( [phone]) referral.     DIVYA completed [facility] ( [phone]) referral to Tabby Solares in intake, who reported that children services was familiar with the family, and that a  will be assigned later today. DIVYA informed Joellen Irwin with Peer Support that [facility] would be interested in meeting with her and discussing options.      PLAN    Baby can NOT be discharged home until [facility] ( [phone]) provides disposition  SW to continue communication with nursing staff and [facility] ( [phone])   SW to continue to follow   Electronically signed by LENNOX Kelsey on 4/22/2021 at 10:45 AM

## 2021-04-22 NOTE — PROGRESS NOTES
[facility] [place] [facility] screening results were discussed with parent. Questions answered. Brochure given to parent. Advised to monitor developmental milestones and contact physician for any concerns.    Erica Hollingsworth    SPOKE TO MOM VIA PHONE DUE TO BEING ON A DIFFERENT UNIT

## 2021-04-22 NOTE — PLAN OF CARE
Problem: Pain:  Description: Pain management should include both nonpharmacologic and pharmacologic interventions.   Goal: Pain level will decrease  Description: Pain level will decrease  4/22/2021 0232 by Gisella Medina RN  Outcome: Met This Shift  Goal: Control of acute pain  Description: Control of acute pain  4/22/2021 0232 by Gisella Medina RN  Outcome: Met This Shift  Goal: Control of chronic pain  Description: Control of chronic pain  4/22/2021 0232 by Gisella Medina RN  Outcome: Met This Shift     Problem: Falls - Risk of:  Goal: Will remain free from falls  Description: Will remain free from falls  4/22/2021 0232 by Gisella Medina RN  Outcome: Met This Shift  Goal: Absence of physical injury  Description: Absence of physical injury  4/22/2021 0232 by Gisella Medina RN  Outcome: Met This Shift

## 2021-04-23 LAB
ANION GAP SERPL CALCULATED.3IONS-SCNC: 9 MMOL/L (ref 7–16)
BASOPHILS ABSOLUTE: 0.05 E9/L (ref 0–0.2)
BASOPHILS RELATIVE PERCENT: 0.4 % (ref 0–2)
BUN BLDV-MCNC: 7 MG/DL (ref 6–20)
CALCIUM SERPL-MCNC: 8.4 MG/DL (ref 8.6–10.2)
CHLORIDE BLD-SCNC: 108 MMOL/L (ref 98–107)
CO2: 23 MMOL/L (ref 22–29)
CREAT SERPL-MCNC: 0.6 MG/DL (ref 0.5–1)
EOSINOPHILS ABSOLUTE: 0.18 E9/L (ref 0.05–0.5)
EOSINOPHILS RELATIVE PERCENT: 1.4 % (ref 0–6)
GFR AFRICAN AMERICAN: >60
GFR NON-AFRICAN AMERICAN: >60 ML/MIN/1.73
GLUCOSE BLD-MCNC: 83 MG/DL (ref 74–99)
HCT VFR BLD CALC: 30.7 % (ref 34–48)
HEMOGLOBIN: 9.9 G/DL (ref 11.5–15.5)
IMMATURE GRANULOCYTES #: 0.1 E9/L
IMMATURE GRANULOCYTES %: 0.8 % (ref 0–5)
LYMPHOCYTES ABSOLUTE: 2.33 E9/L (ref 1.5–4)
LYMPHOCYTES RELATIVE PERCENT: 18.2 % (ref 20–42)
MAGNESIUM: 1.8 MG/DL (ref 1.6–2.6)
MCH RBC QN AUTO: 28 PG (ref 26–35)
MCHC RBC AUTO-ENTMCNC: 32.2 % (ref 32–34.5)
MCV RBC AUTO: 86.7 FL (ref 80–99.9)
MONOCYTES ABSOLUTE: 0.58 E9/L (ref 0.1–0.95)
MONOCYTES RELATIVE PERCENT: 4.5 % (ref 2–12)
NEUTROPHILS ABSOLUTE: 9.59 E9/L (ref 1.8–7.3)
NEUTROPHILS RELATIVE PERCENT: 74.7 % (ref 43–80)
PDW BLD-RTO: 13.5 FL (ref 11.5–15)
PLATELET # BLD: 281 E9/L (ref 130–450)
PMV BLD AUTO: 10.8 FL (ref 7–12)
POTASSIUM SERPL-SCNC: 3.7 MMOL/L (ref 3.5–5)
RBC # BLD: 3.54 E12/L (ref 3.5–5.5)
SODIUM BLD-SCNC: 140 MMOL/L (ref 132–146)
WBC # BLD: 12.8 E9/L (ref 4.5–11.5)

## 2021-04-23 PROCEDURE — 80048 BASIC METABOLIC PNL TOTAL CA: CPT

## 2021-04-23 PROCEDURE — 36415 COLL VENOUS BLD VENIPUNCTURE: CPT

## 2021-04-23 PROCEDURE — 85025 COMPLETE CBC W/AUTO DIFF WBC: CPT

## 2021-04-23 PROCEDURE — 2580000003 HC RX 258: Performed by: INTERNAL MEDICINE

## 2021-04-23 PROCEDURE — 6370000000 HC RX 637 (ALT 250 FOR IP): Performed by: INTERNAL MEDICINE

## 2021-04-23 PROCEDURE — 2060000000 HC ICU INTERMEDIATE R&B

## 2021-04-23 PROCEDURE — 6360000002 HC RX W HCPCS: Performed by: INTERNAL MEDICINE

## 2021-04-23 PROCEDURE — 6370000000 HC RX 637 (ALT 250 FOR IP): Performed by: STUDENT IN AN ORGANIZED HEALTH CARE EDUCATION/TRAINING PROGRAM

## 2021-04-23 PROCEDURE — 83735 ASSAY OF MAGNESIUM: CPT

## 2021-04-23 PROCEDURE — 99233 SBSQ HOSP IP/OBS HIGH 50: CPT | Performed by: INTERNAL MEDICINE

## 2021-04-23 PROCEDURE — 2500000003 HC RX 250 WO HCPCS: Performed by: INTERNAL MEDICINE

## 2021-04-23 PROCEDURE — 99232 SBSQ HOSP IP/OBS MODERATE 35: CPT | Performed by: STUDENT IN AN ORGANIZED HEALTH CARE EDUCATION/TRAINING PROGRAM

## 2021-04-23 RX ORDER — NIFEDIPINE 30 MG/1
90 TABLET, FILM COATED, EXTENDED RELEASE ORAL DAILY
Status: DISCONTINUED | OUTPATIENT
Start: 2021-04-23 | End: 2021-04-26 | Stop reason: HOSPADM

## 2021-04-23 RX ORDER — BUPRENORPHINE HYDROCHLORIDE 8 MG/1
8 TABLET SUBLINGUAL DAILY
Status: DISCONTINUED | OUTPATIENT
Start: 2021-04-23 | End: 2021-04-26 | Stop reason: HOSPADM

## 2021-04-23 RX ORDER — SPIRONOLACTONE 25 MG/1
25 TABLET ORAL DAILY
Status: DISCONTINUED | OUTPATIENT
Start: 2021-04-23 | End: 2021-04-26 | Stop reason: HOSPADM

## 2021-04-23 RX ADMIN — LABETALOL HYDROCHLORIDE 10 MG: 5 INJECTION INTRAVENOUS at 12:53

## 2021-04-23 RX ADMIN — SODIUM CHLORIDE, POTASSIUM CHLORIDE, SODIUM LACTATE AND CALCIUM CHLORIDE: 600; 310; 30; 20 INJECTION, SOLUTION INTRAVENOUS at 18:01

## 2021-04-23 RX ADMIN — LABETALOL HYDROCHLORIDE 10 MG: 5 INJECTION INTRAVENOUS at 07:36

## 2021-04-23 RX ADMIN — NIFEDIPINE 90 MG: 30 TABLET, EXTENDED RELEASE ORAL at 11:21

## 2021-04-23 RX ADMIN — ACETAMINOPHEN 500 MG: 500 TABLET ORAL at 07:36

## 2021-04-23 RX ADMIN — ACETAMINOPHEN 500 MG: 500 TABLET ORAL at 19:42

## 2021-04-23 RX ADMIN — BUPRENORPHINE 8 MG: 8 TABLET SUBLINGUAL at 13:58

## 2021-04-23 RX ADMIN — ACETAMINOPHEN 500 MG: 500 TABLET ORAL at 03:04

## 2021-04-23 RX ADMIN — FERROUS SULFATE TAB 325 MG (65 MG ELEMENTAL FE) 325 MG: 325 (65 FE) TAB at 16:41

## 2021-04-23 RX ADMIN — HYDROCHLOROTHIAZIDE 25 MG: 25 TABLET ORAL at 08:28

## 2021-04-23 RX ADMIN — SPIRONOLACTONE 25 MG: 25 TABLET ORAL at 09:50

## 2021-04-23 RX ADMIN — AMLODIPINE BESYLATE 5 MG: 5 TABLET ORAL at 08:28

## 2021-04-23 RX ADMIN — LABETALOL HYDROCHLORIDE 10 MG: 5 INJECTION INTRAVENOUS at 03:03

## 2021-04-23 RX ADMIN — FERROUS SULFATE TAB 325 MG (65 MG ELEMENTAL FE) 325 MG: 325 (65 FE) TAB at 08:28

## 2021-04-23 ASSESSMENT — PAIN DESCRIPTION - LOCATION
LOCATION: BACK
LOCATION: HEAD
LOCATION: GENERALIZED

## 2021-04-23 ASSESSMENT — PAIN SCALES - GENERAL
PAINLEVEL_OUTOF10: 0
PAINLEVEL_OUTOF10: 10
PAINLEVEL_OUTOF10: 0
PAINLEVEL_OUTOF10: 10
PAINLEVEL_OUTOF10: 5
PAINLEVEL_OUTOF10: 2

## 2021-04-23 ASSESSMENT — PAIN DESCRIPTION - DESCRIPTORS
DESCRIPTORS: ACHING
DESCRIPTORS: ACHING;CONSTANT;OTHER (COMMENT)

## 2021-04-23 ASSESSMENT — PAIN DESCRIPTION - ONSET
ONSET: ON-GOING
ONSET: ON-GOING
ONSET: GRADUAL

## 2021-04-23 ASSESSMENT — PAIN DESCRIPTION - PROGRESSION
CLINICAL_PROGRESSION: GRADUALLY WORSENING
CLINICAL_PROGRESSION: NOT CHANGED
CLINICAL_PROGRESSION: NOT CHANGED

## 2021-04-23 ASSESSMENT — PAIN - FUNCTIONAL ASSESSMENT
PAIN_FUNCTIONAL_ASSESSMENT: ACTIVITIES ARE NOT PREVENTED
PAIN_FUNCTIONAL_ASSESSMENT: PREVENTS OR INTERFERES SOME ACTIVE ACTIVITIES AND ADLS

## 2021-04-23 ASSESSMENT — PAIN DESCRIPTION - PAIN TYPE: TYPE: CHRONIC PAIN

## 2021-04-23 ASSESSMENT — PAIN DESCRIPTION - FREQUENCY
FREQUENCY: INTERMITTENT
FREQUENCY: INTERMITTENT

## 2021-04-23 NOTE — PROGRESS NOTES
INPATIENT CARDIOLOGY FOLLOW-UP    Name: Bart Singleton    Age: 39 y.o. Date of Admission: 4/21/2021  7:04 AM    Date of Service: 4/23/2021    Interim History:  BP elevated.      Review of Systems:   Cardiac: As per HPI  General: No fever, chills  Pulmonary: As per HPI  HEENT: No visual disturbances, difficult swallowing  GI: No nausea, vomiting  Endocrine: No thyroid disease or DM  Musculoskeletal: SILVERMAN x 4, no focal motor deficits  Skin: Intact, no rashes  Neuro/Psych: No headache or seizures    Problem List:  Patient Active Problem List   Diagnosis    Right ankle sprain    Hypertension    Hypertensive emergency    [facility] cardiomyopathy) [facility] [address]    Hypokalemia    Acute cystitis without hematuria    No prenatal care in current pregnancy in third trimester    Postpartum examination following vaginal delivery    Hypertensive crisis    Pre-eclampsia, postpartum       Allergies:  No Known Allergies    Current Medications:  Current Facility-Administered Medications   Medication Dose Route Frequency Provider Last Rate Last Admin    NIFEdipine (ADALAT CC) extended release tablet 90 mg  90 mg Oral Daily Ana Rivera MD        spironolactone (ALDACTONE) tablet 25 mg  25 mg Oral Daily Ana Rivera MD   25 mg at 04/23/21 0950    hydroCHLOROthiazide (HYDRODIURIL) tablet 25 mg  25 mg Oral Daily Ana Rivera MD   25 mg at 04/23/21 8977    acetaminophen (TYLENOL) tablet 500 mg  500 mg Oral Q6H PRN Rufus Friedman MD   500 mg at 04/23/21 5000    lansinoh lanolin ointment   Topical PRN Craig Smith DO        witch hazel-glycerin (TUCKS) pad   Topical PRN Priscila Veloz DO        benzocaine-menthol (DERMOPLAST) 20-0.5 % spray   Topical PRN Craig Smith DO        ferrous sulfate (IRON 325) tablet 325 mg  325 mg Oral BID YOBANY Veloz DO   325 mg at 04/23/21 9643    measles, mumps & rubella vaccine (MMR) injection 0.5 mL  0.5 mL Subcutaneous Prior to discharge Priscila Arora Toolson, DO        Tetanus-Diphth-Acell Pertussis (BOOSTRIX) injection 0.5 mL  0.5 mL Intramuscular Prior to discharge Phi Coon, DO        calcium gluconate 10 % injection 1,000 mg  1,000 mg Intravenous PRN Phi Coon, DO        lactated ringers infusion   Intravenous Continuous Phi Coon, DO 50 mL/hr at 04/22/21 0920 New Bag at 04/22/21 0920    perflutren lipid microspheres (DEFINITY) injection 1.65 mg  1.5 mL Intravenous ONCE PRN Loralie Darlington Toolson, DO        labetalol (NORMODYNE;TRANDATE) injection 10 mg  10 mg Intravenous Q4H PRN Loralie Zheng Toolson, DO   10 mg at 04/23/21 0736    hydrALAZINE (APRESOLINE) injection 10 mg  10 mg Intravenous Q2H PRN Loralie Zheng Toolson, DO   10 mg at 04/22/21 2313      lactated ringers 50 mL/hr at 04/22/21 0920       Physical Exam:  BP (!) 201/105   Pulse 104   Temp 97.6 °F (36.4 °C) (Oral)   Resp 20   Ht 5' 7\" (1.702 m)   Wt 143 lb 8.3 oz (65.1 kg)   SpO2 99%   BMI 22.48 kg/m²   Wt Readings from Last 3 Encounters:   04/23/21 143 lb 8.3 oz (65.1 kg)   12/12/17 177 lb (80.3 kg)   12/12/17 177 lb (80.3 kg)     Appearance: Awake, alert, no acute respiratory distress  Skin: Intact, no rash  Head: Normocephalic, atraumatic  Neck: Supple, no elevated JVP, no carotid bruits  Lungs: Clear to auscultation bilaterally. No wheezes, rales, or rhonchi. Cardiac: Regular rate and rhythm, +S1S2, no murmurs apparent  Abdomen: Soft, nontender, +bowel sounds  Extremities: Moves all extremities x 4, no lower extremity edema  Neurologic: No focal motor deficits apparent, normal mood and affect  Peripheral Pulses: Intact posterior tibial pulses bilaterally    Intake/Output:    Intake/Output Summary (Last 24 hours) at 4/23/2021 1053  Last data filed at 4/22/2021 1330  Gross per 24 hour   Intake 480 ml   Output 2050 ml   Net -1570 ml     No intake/output data recorded.     Laboratory Tests:  Recent Labs     04/21/21  0800 04/22/21  0415 04/23/21  0610    135 140   K 3.7 3.9 3.7   CL 102 104 108*   CO2 23 25 23   BUN 15 11 7   CREATININE 0.7 0.6 0.6   GLUCOSE 145* 77 83   CALCIUM 8.5* 7.5* 8.4*     Lab Results   Component Value Date    MG 1.8 2021     Recent Labs     21  0800   ALKPHOS 179*   ALT 16   AST 27   PROT 5.7*   BILITOT <0.2   LABALBU 2.9*     Recent Labs     21  0800 21  0415 21  0610   WBC 12.6* 12.3* 12.8*   RBC 4.13 3.55 3.54   HGB 11.8 10.0* 9.9*   HCT 35.0 30.6* 30.7*   MCV 84.7 86.2 86.7   MCH 28.6 28.2 28.0   MCHC 33.7 32.7 32.2   RDW 13.2 13.6 13.5    244 281   MPV 10.9 10.8 10.8     Lab Results   Component Value Date    CKTOTAL 64 2013    CKMB 0.9 2013    TROPONINI <0.01 2017    TROPONINI <0.01 2017    TROPONINI <0.01 2016     Lab Results   Component Value Date    INR 0.9 2021    PROTIME 9.7 2021     Lab Results   Component Value Date    TSH 2.075 2013     Lab Results   Component Value Date    LABA1C 4.8 2021     No results found for: EAG  Lab Results   Component Value Date    CHOL 274 (H) 2013     Lab Results   Component Value Date    TRIG 259 (H) 2013     Lab Results   Component Value Date    HDL 83.0 2013     Lab Results   Component Value Date    LDLCALC 139 (H) 2013     No results found for: LABVLDL, VLDL  No results found for: CHOLHDLRATIO  No results for input(s): PROBNP in the last 72 hours. ASSESSMENT / PLAN:  71-year-old G4, P4 female with medical history of hypertensionnot taking her medications, polysubstance abuse, not receiving  medical care underwent vaginal delivery at her house and then presented to the hospital with continuous bleeding found to have retained placenta with blood pressure of 549 and diastolic blood pressure of 140 mmHg with significant proteinuria. Patient underwent induction to remove the placenta and then infused with IV magnesium sulfate. We are consulted for further management of her blood pressure.  Echocardiogram

## 2021-04-23 NOTE — CARE COORDINATION
This note will not be viewable in [employer] for the following reason(s). [place]: Unable to separate mother vs.  [address]     Discharge Planning     Karla Torres signed a release of information for DIVYA to talk with [facility]. DIVYA sent release of information and referral to Fozia Pham. DIVYA Spoke with Sima Nichols, who reported that IF Karla Torres was discharged by Monday, she would be able to get her an appointment on Monday. Sima Nichols reported that she will not be able to make any appointments for Glendy until Monday. Gabriela Cintron From Children services also presented to the hospital to meet with mother today. Bianca Aguilar reported at this time he is looking for a family member to take baby, and [facility] not [facility] ( [phone]) may need to take custody of baby. Bianca Aguilar did report that Karla Torres does NOT have custody of her other children, and that their father currently holds custody.      PLAN    Baby can NOT be discharged home until [facility] ( [phone]) provides disposition  SW to [facility] communication with nursing staff and [facility] ( [phone])     SW to attempt to make [facility] appointment for Karla Torres on Monday     Electronically signed by [facility] on 2021 at 2:13 PM

## 2021-04-23 NOTE — PROGRESS NOTES
Post-Partum Note    PPD#2     S:  Patient awake, coherent and cooperative. States having some generalized body aches thought to be secondary to probable withdrawal symptoms. Denies headache. Lochia normal.  Ambulating. Willing to undergo rehab for sake of her baby. O: BP (!) 190/106   Pulse 104   Temp 97.6 °F (36.4 °C) (Oral)   Resp 20   Ht 5' 7\" (1.702 m)   Wt 143 lb 8.3 oz (65.1 kg)   SpO2 99%   BMI 22.48 kg/m²    Good urine output             ABD:    Uterus firm, non-tender. EXT:     No Carlos A's, no edema    LABS:     Hemoglobin/Hematocrit:    Lab Results   Component Value Date    HGB 9.9 04/23/2021    HCT 30.7 04/23/2021       IMP:  1. PPD#2, status-post vaginal delivery            2.  Untreated (due to non-compliance) essential hypertension with hypertensive crisis, no further signs of preeclampsia nor HELLP syndrome           3. Polysubstance abuse contributing to above, currently experiencing withdrawal symptoms           4. Hypertrophic cardiomyopathy           5. Chronic anemia  Patient Active Problem List   Diagnosis    Right ankle sprain    Hypertension    Hypertensive emergency    [facility]) ([facility] [address]    Hypokalemia    Acute cystitis without hematuria    No prenatal care in current pregnancy in third trimester    Postpartum examination following vaginal delivery    Hypertensive crisis    Pre-eclampsia, postpartum     Plan: 1. Routine post-partum care           2. Continued medical management of anti-hypertensives           3. Iron supplementation           4.   Consider Psychiatric consultation or inpatient/outpatient re-hab upon discharge

## 2021-04-23 NOTE — CARE COORDINATION
This note will not be viewable in [facility] for the following reason(s). [place]: Unable to separate mother vs.  [address]       Discharge Planning     SW spoke with patient, Carly Rodriguez again by phone to aide in discharge planning. Drake Funez reported to DIVYA that she is still interested in treatment, however again refused inpatient treatment when DIVYA discussed it with her. Drake Funez reported that she is interested in [facility] outpatient program and was agreeable for DIVYA to complete a referral. DIVYA also discussed insurance issues with Drake Funez. Drake Funez stated that she currently does not have any health insurance. DIVYA notified Valentina in public benefits, who reported that due to Drake Funez receiving 500 dollars a month in unemployment, she does not qualify for medicaid. Valentina reported that she is looking further into this. SW present to Glendy's  room for her to sign release of information for referral to [facility], however patient was asleep and did not wake up when DIVYA called her name. Releases were left with her nurse, Jass Tovar. After leaving the unit, DIVYA was informed that Pavan Kindrajordyn from [facility] ( [phone]) presented to the 4th floor to meet with Glendy in regards to Glendy's baby. PLAN    Baby can NOT be discharged home until [facility] ( [phone]) provides disposition  SW to continue communication with nursing staff and [facility] ( [phone])     DIVYA to continue to work on helping patient find treatment.      Electronically signed by LENNOX Carver on 2021 at 10:55 AM

## 2021-04-23 NOTE — PROGRESS NOTES
Dr. Charlotte Palacios contacted regarding patient's complaint of generalized pain and requesting for loyd catheter to be discontinued. Orders received.

## 2021-04-23 NOTE — PROGRESS NOTES
Prior to discharge     acetaminophen, 500 mg, Q6H PRN  lanolin, , PRN  witch hazel-glycerin, , PRN  benzocaine-menthol, , PRN  calcium gluconate, 1,000 mg, PRN  perflutren lipid microspheres, 1.5 mL, ONCE PRN  labetalol, 10 mg, Q4H PRN  hydrALAZINE, 10 mg, Q2H PRN         Objective:    BP (!) 195/112   Pulse 104   Temp 97.6 °F (36.4 °C) (Oral)   Resp 20   Ht 5' 7\" (1.702 m)   Wt 143 lb 8.3 oz (65.1 kg)   SpO2 99%   BMI 22.48 kg/m²   General Appearance: alert and oriented to person, place and time, well-developed and well-nourished, in no acute distress  Skin: warm and dry, no rash or erythema  Head: normocephalic and atraumatic  Eyes: pupils equal, round, and reactive to light, extraocular eye movements intact, conjunctivae normal  ENT: tympanic membrane, external ear and ear canal normal bilaterally, oropharynx clear and moist with normal mucous membranes  Neck: neck supple and non tender without mass, no thyromegaly or thyroid nodules, no cervical lymphadenopathy   Pulmonary/Chest: clear to auscultation bilaterally- no wheezes, rales or rhonchi, normal air movement, no respiratory distress  Cardiovascular: normal rate, normal S1 and S2, no gallops, intact distal pulses and no carotid bruits  Abdomen: soft, non-tender, non-distended, normal bowel sounds, no masses or organomegaly      Recent Labs     04/21/21  0800 04/22/21  0415 04/23/21  0610    135 140   K 3.7 3.9 3.7    104 108*   CO2 23 25 23   BUN 15 11 7   CREATININE 0.7 0.6 0.6   GLUCOSE 145* 77 83   CALCIUM 8.5* 7.5* 8.4*       Recent Labs     04/21/21  0800 04/22/21  0415 04/23/21  0610   WBC 12.6* 12.3* 12.8*   RBC 4.13 3.55 3.54   HGB 11.8 10.0* 9.9*   HCT 35.0 30.6* 30.7*   MCV 84.7 86.2 86.7   MCH 28.6 28.2 28.0   MCHC 33.7 32.7 32.2   RDW 13.2 13.6 13.5    244 281   MPV 10.9 10.8 10.8       Radiology:   No orders to display       Assessment:    Active Problems:    No prenatal care in current pregnancy in third trimester Postpartum examination following vaginal delivery    Hypertensive crisis    Pre-eclampsia, postpartum  Resolved Problems:    * No resolved hospital problems. *      Plan:  1. Hypertensive urgency, BP has shown improvement, still not controlled. Cardiology managing medications for hypertension. Changes are made. 2. Abnormal tox screen, substance abuse, as per patient last dose of Suboxone she was on is 8 mg, will start with 8 mg now, patient was explained at length the importance of staying on Suboxone replacement. Better controlled of withdrawal will also help in improvement of BP control. 3. Okay for mother to visit the baby, nursing updated. 4. Leukocytosis, just observe.         Electronically signed by Tiffany Corea MD on 4/23/2021 at 8:51 AM

## 2021-04-23 NOTE — CARE COORDINATION
This note will not be viewable in [employer] for the following reason(s). Suspected substance abuse disorder. Peer Recovery Support Note    Name: Boris Dent  Date: 4/23/2021    Chief Complaint   Patient presents with    Hypertension     vaginal delivey at home        Peer Support met with patient. [] Support and education provided  [] Resources provided   [] Treatment referral:   [] Other:   [x] Patient declined peer recovery services     Referred By: Tucker VILLA   Notes:     Stpehenie Medeiros, 4/23/2021

## 2021-04-24 LAB
AMPHETAMINES, URINE: >5000 NG/ML
ANION GAP SERPL CALCULATED.3IONS-SCNC: 7 MMOL/L (ref 7–16)
BASOPHILS ABSOLUTE: 0.07 E9/L (ref 0–0.2)
BASOPHILS RELATIVE PERCENT: 0.5 % (ref 0–2)
BUN BLDV-MCNC: 8 MG/DL (ref 6–20)
CALCIUM SERPL-MCNC: 8.6 MG/DL (ref 8.6–10.2)
CHLORIDE BLD-SCNC: 104 MMOL/L (ref 98–107)
CO2: 26 MMOL/L (ref 22–29)
CREAT SERPL-MCNC: 0.6 MG/DL (ref 0.5–1)
EOSINOPHILS ABSOLUTE: 0.3 E9/L (ref 0.05–0.5)
EOSINOPHILS RELATIVE PERCENT: 2.3 % (ref 0–6)
GFR AFRICAN AMERICAN: >60
GFR NON-AFRICAN AMERICAN: >60 ML/MIN/1.73
GLUCOSE BLD-MCNC: 86 MG/DL (ref 74–99)
HCT VFR BLD CALC: 32.1 % (ref 34–48)
HEMOGLOBIN: 11.1 G/DL (ref 11.5–15.5)
IMMATURE GRANULOCYTES #: 0.1 E9/L
IMMATURE GRANULOCYTES %: 0.8 % (ref 0–5)
LYMPHOCYTES ABSOLUTE: 2.51 E9/L (ref 1.5–4)
LYMPHOCYTES RELATIVE PERCENT: 18.8 % (ref 20–42)
MAGNESIUM: 1.9 MG/DL (ref 1.6–2.6)
MCH RBC QN AUTO: 29.8 PG (ref 26–35)
MCHC RBC AUTO-ENTMCNC: 34.6 % (ref 32–34.5)
MCV RBC AUTO: 86.3 FL (ref 80–99.9)
METHAMPHETAMINE, URINE: NORMAL NG/ML
METHYLENEDIOXYAMPHETAMINE, UR: <200 NG/ML
METHYLENEDIOXYETHYLAMPHETAMINE, UR: <200 NG/ML
METHYLENEDIOXYMETHAMPHETAMINE, UR: <200 NG/ML
MONOCYTES ABSOLUTE: 0.58 E9/L (ref 0.1–0.95)
MONOCYTES RELATIVE PERCENT: 4.4 % (ref 2–12)
NEUTROPHILS ABSOLUTE: 9.76 E9/L (ref 1.8–7.3)
NEUTROPHILS RELATIVE PERCENT: 73.2 % (ref 43–80)
PDW BLD-RTO: 13.6 FL (ref 11.5–15)
PLATELET # BLD: 319 E9/L (ref 130–450)
PMV BLD AUTO: 10.3 FL (ref 7–12)
POTASSIUM SERPL-SCNC: 4 MMOL/L (ref 3.5–5)
RBC # BLD: 3.72 E12/L (ref 3.5–5.5)
RUBELLA IGM: <10 AU/ML
SODIUM BLD-SCNC: 137 MMOL/L (ref 132–146)
WBC # BLD: 13.3 E9/L (ref 4.5–11.5)

## 2021-04-24 PROCEDURE — 83735 ASSAY OF MAGNESIUM: CPT

## 2021-04-24 PROCEDURE — 85025 COMPLETE CBC W/AUTO DIFF WBC: CPT

## 2021-04-24 PROCEDURE — 36415 COLL VENOUS BLD VENIPUNCTURE: CPT

## 2021-04-24 PROCEDURE — 2500000003 HC RX 250 WO HCPCS: Performed by: INTERNAL MEDICINE

## 2021-04-24 PROCEDURE — 6370000000 HC RX 637 (ALT 250 FOR IP): Performed by: INTERNAL MEDICINE

## 2021-04-24 PROCEDURE — 99232 SBSQ HOSP IP/OBS MODERATE 35: CPT | Performed by: INTERNAL MEDICINE

## 2021-04-24 PROCEDURE — 99233 SBSQ HOSP IP/OBS HIGH 50: CPT | Performed by: INTERNAL MEDICINE

## 2021-04-24 PROCEDURE — 2060000000 HC ICU INTERMEDIATE R&B

## 2021-04-24 PROCEDURE — 6360000002 HC RX W HCPCS: Performed by: INTERNAL MEDICINE

## 2021-04-24 PROCEDURE — 6370000000 HC RX 637 (ALT 250 FOR IP): Performed by: STUDENT IN AN ORGANIZED HEALTH CARE EDUCATION/TRAINING PROGRAM

## 2021-04-24 PROCEDURE — 99232 SBSQ HOSP IP/OBS MODERATE 35: CPT | Performed by: OBSTETRICS & GYNECOLOGY

## 2021-04-24 PROCEDURE — 80048 BASIC METABOLIC PNL TOTAL CA: CPT

## 2021-04-24 RX ORDER — LABETALOL 100 MG/1
100 TABLET, FILM COATED ORAL EVERY 12 HOURS SCHEDULED
Status: DISCONTINUED | OUTPATIENT
Start: 2021-04-24 | End: 2021-04-25

## 2021-04-24 RX ADMIN — LABETALOL HYDROCHLORIDE 10 MG: 5 INJECTION INTRAVENOUS at 23:43

## 2021-04-24 RX ADMIN — SPIRONOLACTONE 25 MG: 25 TABLET ORAL at 08:49

## 2021-04-24 RX ADMIN — FERROUS SULFATE TAB 325 MG (65 MG ELEMENTAL FE) 325 MG: 325 (65 FE) TAB at 18:38

## 2021-04-24 RX ADMIN — LABETALOL HYDROCHLORIDE 10 MG: 5 INJECTION INTRAVENOUS at 06:27

## 2021-04-24 RX ADMIN — NIFEDIPINE 90 MG: 30 TABLET, EXTENDED RELEASE ORAL at 08:49

## 2021-04-24 RX ADMIN — ACETAMINOPHEN 500 MG: 500 TABLET ORAL at 14:57

## 2021-04-24 RX ADMIN — FERROUS SULFATE TAB 325 MG (65 MG ELEMENTAL FE) 325 MG: 325 (65 FE) TAB at 08:49

## 2021-04-24 RX ADMIN — HYDROCHLOROTHIAZIDE 25 MG: 25 TABLET ORAL at 08:49

## 2021-04-24 RX ADMIN — BUPRENORPHINE 8 MG: 8 TABLET SUBLINGUAL at 08:49

## 2021-04-24 RX ADMIN — LABETALOL HYDROCHLORIDE 100 MG: 100 TABLET, FILM COATED ORAL at 20:47

## 2021-04-24 ASSESSMENT — PAIN DESCRIPTION - LOCATION: LOCATION: HEAD

## 2021-04-24 ASSESSMENT — PAIN SCALES - GENERAL: PAINLEVEL_OUTOF10: 0

## 2021-04-24 NOTE — PROGRESS NOTES
Department of Obstetrics and Gynecology  Labor and Delivery  Attending Post Partum Progress Note      SUBJECTIVE:  Doing well with no complaints  OBJECTIVE:      Vitals:  BP (!) 140/85   Pulse 118   Temp 98.5 °F (36.9 °C) (Oral)   Resp 18   Ht 5' 7\" (1.702 m)   Wt 141 lb 1.5 oz (64 kg)   SpO2 97%   BMI 22.10 kg/m²     ABDOMEN:  Soft, well contracted uterus   Lochia: Normal  No calf tenderness    DATA:    CBC:    Lab Results   Component Value Date    WBC 13.3 04/24/2021    RBC 3.72 04/24/2021    HGB 11.1 04/24/2021    HCT 32.1 04/24/2021    MCV 86.3 04/24/2021    RDW 13.6 04/24/2021     04/24/2021       ASSESSMENT & PLAN:      IMP:  1. PPD#3, status-post vaginal delivery            2.  Untreated (due to non-compliance) essential hypertension with hypertensive crisis, no further signs of preeclampsia nor HELLP syndrome           3. Polysubstance abuse contributing to above, currently experiencing withdrawal symptoms           4. Hypertrophic cardiomyopathy           5. Chronic anemia    Continue current care.   Discharge when okay with the medical team

## 2021-04-24 NOTE — PROGRESS NOTES
INPATIENT CARDIOLOGY FOLLOW-UP    Name: Almita Gonzalez    Age: 39 y.o. Date of Admission: 4/21/2021  7:04 AM    Date of Service: 4/24/2021    Chief Complaint: Follow-up for hypertension    Interim History:  No new overnight cardiac complaints. Currently with no complaints of CP, SOB, palpitations, dizziness, or lightheadedness. SR on telemetry.     Review of Systems:   Cardiac: As per HPI  General: No fever, chills  Pulmonary: As per HPI  HEENT: No visual disturbances, difficult swallowing  GI: No nausea, vomiting  Endocrine: No thyroid disease or DM  Musculoskeletal: SILVERMAN x 4, no focal motor deficits  Skin: Intact, no rashes  Neuro/Psych: No headache or seizures    Problem List:  Patient Active Problem List   Diagnosis    Right ankle sprain    Hypertension    Hypertensive emergency    [facility]M [facility]hypertrophic obstructive cardiomyopathy) ([facility] [address]    Hypokalemia    Acute cystitis without hematuria    No prenatal care in current pregnancy in third trimester    Postpartum examination following vaginal delivery    Hypertensive crisis    Pre-eclampsia, postpartum       Allergies:  No Known Allergies    Current Medications:  Current Facility-Administered Medications   Medication Dose Route Frequency Provider Last Rate Last Admin    NIFEdipine (ADALAT CC) extended release tablet 90 mg  90 mg Oral Daily Manda Cali MD   90 mg at 04/24/21 0849    spironolactone (ALDACTONE) tablet 25 mg  25 mg Oral Daily Manda Cali MD   25 mg at 04/24/21 0849    buprenorphine (SUBUTEX) SL tablet 8 mg  8 mg Sublingual Daily Pinky Blevins MD   8 mg at 04/24/21 0849    hydroCHLOROthiazide (HYDRODIURIL) tablet 25 mg  25 mg Oral Daily Manda Cali MD   25 mg at 04/24/21 0849    acetaminophen (TYLENOL) tablet 500 mg  500 mg Oral Q6H PRN Alejandra Young MD   500 mg at 04/23/21 1942    lansinoh lanolin ointment   Topical PRN Fercho Allison DO        witch hazel-glycerin (TUCKS) pad   Topical PRN Fercho Allison affect  Peripheral Pulses: Intact posterior tibial pulses bilaterally    Intake/Output:    Intake/Output Summary (Last 24 hours) at 4/24/2021 0853  Last data filed at 4/23/2021 1329  Gross per 24 hour   Intake 520 ml   Output --   Net 520 ml     No intake/output data recorded. Laboratory Tests:  Recent Labs     04/22/21  0415 04/23/21  0610 04/24/21  0630    140 137   K 3.9 3.7 4.0    108* 104   CO2 25 23 26   BUN 11 7 8   CREATININE 0.6 0.6 0.6   GLUCOSE 77 83 86   CALCIUM 7.5* 8.4* 8.6     Lab Results   Component Value Date    MG 1.9 04/24/2021     No results for input(s): ALKPHOS, ALT, AST, PROT, BILITOT, BILIDIR, LABALBU in the last 72 hours. Recent Labs     04/22/21  0415 04/23/21  0610 04/24/21  0630   WBC 12.3* 12.8* 13.3*   RBC 3.55 3.54 3.72   HGB 10.0* 9.9* 11.1*   HCT 30.6* 30.7* 32.1*   MCV 86.2 86.7 86.3   MCH 28.2 28.0 29.8   MCHC 32.7 32.2 34.6*   RDW 13.6 13.5 13.6    281 319   MPV 10.8 10.8 10.3     Lab Results   Component Value Date    CKTOTAL 64 09/18/2013    CKMB 0.9 09/18/2013    TROPONINI <0.01 12/12/2017    TROPONINI <0.01 04/04/2017    TROPONINI <0.01 04/19/2016     Lab Results   Component Value Date    INR 0.9 04/21/2021    PROTIME 9.7 04/21/2021     Lab Results   Component Value Date    TSH 2.075 09/18/2013     Lab Results   Component Value Date    LABA1C 4.8 04/21/2021     No results found for: EAG  Lab Results   Component Value Date    CHOL 274 (H) 09/19/2013     Lab Results   Component Value Date    TRIG 259 (H) 09/19/2013     Lab Results   Component Value Date    HDL 83.0 09/19/2013     Lab Results   Component Value Date    LDLCALC 139 (H) 09/19/2013     No results found for: LABVLDL, VLDL  No results found for: CHOLHDLRATIO    Cardiac Tests:  ECG: Normal sinus rhythm, prolonged QT interval nonspecific T wave normality, abnormal EKG. Telemetry findings reviewed: Sinus tachycardia with heart rate in the 100s.       Echocardiogram-4/21/2021:   Summary   Severe

## 2021-04-24 NOTE — PROGRESS NOTES
[facility] CARE AT [facility] Hospitalist   Progress Note    Admitting Date and Time: 4/21/2021  7:04 AM  Admit Dx: No prenatal care in current pregnancy in third trimester [O09.33]    Subjective: Following for medical management, admitted in the morning of 21st, following delivery at home, patient came with placenta in situ, near term, history of hypertrophic cardiomyopathy, possible preeclampsia, noted with hypertensive urgency, BP on presentation to 53/141. Initially admitted to ICU. Seen by critical care as well as cardiology. Cardiology managing BP control. Patient is on Norvasc 5 as well as HCTZ 25 daily. Abnormal tox on presentation. Urine tox positive for amphetamines, cannabinoids, opiates, fentanyl, cocaine metabolites. Patient initially treated with IV magnesium. Did speak to high risk OB today, they will be available if needed otherwise most management is medical.  As per  baby cannot be discharged home until [facility] children services provides disposition. Patient with complaint of generalized body ache. BP not well controlled, as of today patient is on Aldactone 25 daily, HCTZ 25 daily, also on nifedipine 90 daily. Social work trying to set up patient for [employer]. Earliest appointment is on Monday. Still BP uncontrolled. Patient was admitted with No prenatal care in current pregnancy in third trimester [O09.33]. Patient is awake, alert, sweaty, less anxious compared to yesterday, does say that she feels a lot better. Per RN: Can we stop IV fluids, were updated to stop the fluids. ROS: denies fever, chills, cp, sob, n/v, HA unless stated above.      NIFEdipine  90 mg Oral Daily    spironolactone  25 mg Oral Daily    buprenorphine  8 mg Sublingual Daily    hydroCHLOROthiazide  25 mg Oral Daily    ferrous sulfate  325 mg Oral BID     measles, mumps & rubella vaccine  0.5 mL Subcutaneous Prior to discharge    Tdap-Dtap  0.5 mL Intramuscular Prior to discharge     acetaminophen, 500 mg, Q6H PRN  lanolin, , PRN  witch hazel-glycerin, , PRN  benzocaine-menthol, , PRN  calcium gluconate, 1,000 mg, PRN  perflutren lipid microspheres, 1.5 mL, ONCE PRN  labetalol, 10 mg, Q4H PRN  hydrALAZINE, 10 mg, Q2H PRN         Objective:    BP (!) 204/113   Pulse 97   Temp 98.1 °F (36.7 °C) (Oral)   Resp 16   Ht 5' 7\" (1.702 m)   Wt 141 lb 1.5 oz (64 kg)   SpO2 98%   BMI 22.10 kg/m²   General Appearance: alert and oriented to person, place and time, well-developed and well-nourished, in no acute distress  Skin: warm and dry, no rash or erythema  Head: normocephalic and atraumatic  Eyes: pupils equal, round, and reactive to light, extraocular eye movements intact, conjunctivae normal  ENT: tympanic membrane, external ear and ear canal normal bilaterally, oropharynx clear and moist with normal mucous membranes  Neck: neck supple and non tender without mass, no thyromegaly or thyroid nodules, no cervical lymphadenopathy   Pulmonary/Chest: clear to auscultation bilaterally- no wheezes, rales or rhonchi, normal air movement, no respiratory distress  Cardiovascular: normal rate, normal S1 and S2, no gallops, intact distal pulses and no carotid bruits  Abdomen: soft, non-tender, non-distended, normal bowel sounds, no masses or organomegaly      Recent Labs     04/22/21  0415 04/23/21  0610 04/24/21  0630    140 137   K 3.9 3.7 4.0    108* 104   CO2 25 23 26   BUN 11 7 8   CREATININE 0.6 0.6 0.6   GLUCOSE 77 83 86   CALCIUM 7.5* 8.4* 8.6       Recent Labs     04/22/21  0415 04/23/21  0610 04/24/21  0630   WBC 12.3* 12.8* 13.3*   RBC 3.55 3.54 3.72   HGB 10.0* 9.9* 11.1*   HCT 30.6* 30.7* 32.1*   MCV 86.2 86.7 86.3   MCH 28.2 28.0 29.8   MCHC 32.7 32.2 34.6*   RDW 13.6 13.5 13.6    281 319   MPV 10.8 10.8 10.3       Radiology:   No orders to display       Assessment:    Active Problems:    No prenatal care in current pregnancy in third trimester    Postpartum examination following vaginal delivery    Hypertensive crisis    Pre-eclampsia, postpartum  Resolved Problems:    * No resolved hospital problems. *      Plan:  1. Hypertensive urgency, BP has shown improvement, still not controlled. Cardiology managing medications for hypertension. Changes are made. Input pending from today. 2. Abnormal tox screen, substance abuse, patient is started on Subutex replacement, do have significant relief, patient does plan to go to [facility] outpatient services, first appointment available Monday. Likely can be DC'd after tomorrow's dose from medicine side. 3. Okay for mother to visit the baby, patient is aware of baby having withdrawal.  4. Leukocytosis, patient afebrile, just observe.         Electronically signed by Carmel Madrid MD on 4/24/2021 at 8:21 AM

## 2021-04-24 NOTE — PLAN OF CARE
Problem: Cardiac:  Goal: Ability to maintain vital signs within normal range will improve  Description: Ability to maintain vital signs within normal range will improve  Outcome: Ongoing  Goal: Cardiovascular alteration will improve  Description: Cardiovascular alteration will improve  Outcome: Ongoing

## 2021-04-25 LAB
ANION GAP SERPL CALCULATED.3IONS-SCNC: 9 MMOL/L (ref 7–16)
BASOPHILS ABSOLUTE: 0.07 E9/L (ref 0–0.2)
BASOPHILS RELATIVE PERCENT: 0.5 % (ref 0–2)
BUN BLDV-MCNC: 14 MG/DL (ref 6–20)
CALCIUM SERPL-MCNC: 8.6 MG/DL (ref 8.6–10.2)
CANNABINOIDS CONF, URINE: 39 NG/ML
CHLORIDE BLD-SCNC: 100 MMOL/L (ref 98–107)
CO2: 27 MMOL/L (ref 22–29)
CREAT SERPL-MCNC: 0.7 MG/DL (ref 0.5–1)
EOSINOPHILS ABSOLUTE: 0.44 E9/L (ref 0.05–0.5)
EOSINOPHILS RELATIVE PERCENT: 3.1 % (ref 0–6)
FENTANYL URINE: 124.6 NG/ML
GFR AFRICAN AMERICAN: >60
GFR NON-AFRICAN AMERICAN: >60 ML/MIN/1.73
GLUCOSE BLD-MCNC: 77 MG/DL (ref 74–99)
HCT VFR BLD CALC: 31.9 % (ref 34–48)
HEMOGLOBIN: 10.4 G/DL (ref 11.5–15.5)
IMMATURE GRANULOCYTES #: 0.09 E9/L
IMMATURE GRANULOCYTES %: 0.6 % (ref 0–5)
LYMPHOCYTES ABSOLUTE: 2.42 E9/L (ref 1.5–4)
LYMPHOCYTES RELATIVE PERCENT: 17 % (ref 20–42)
MAGNESIUM: 1.9 MG/DL (ref 1.6–2.6)
MCH RBC QN AUTO: 28.2 PG (ref 26–35)
MCHC RBC AUTO-ENTMCNC: 32.6 % (ref 32–34.5)
MCV RBC AUTO: 86.4 FL (ref 80–99.9)
MONOCYTES ABSOLUTE: 0.55 E9/L (ref 0.1–0.95)
MONOCYTES RELATIVE PERCENT: 3.9 % (ref 2–12)
NEUTROPHILS ABSOLUTE: 10.64 E9/L (ref 1.8–7.3)
NEUTROPHILS RELATIVE PERCENT: 74.9 % (ref 43–80)
NORFENTANYL, URINE: >1000 NG/ML
PDW BLD-RTO: 13.5 FL (ref 11.5–15)
PLATELET # BLD: 317 E9/L (ref 130–450)
PMV BLD AUTO: 10.1 FL (ref 7–12)
POTASSIUM SERPL-SCNC: 4 MMOL/L (ref 3.5–5)
RBC # BLD: 3.69 E12/L (ref 3.5–5.5)
SODIUM BLD-SCNC: 136 MMOL/L (ref 132–146)
WBC # BLD: 14.2 E9/L (ref 4.5–11.5)

## 2021-04-25 PROCEDURE — 83735 ASSAY OF MAGNESIUM: CPT

## 2021-04-25 PROCEDURE — 85025 COMPLETE CBC W/AUTO DIFF WBC: CPT

## 2021-04-25 PROCEDURE — 99232 SBSQ HOSP IP/OBS MODERATE 35: CPT | Performed by: OBSTETRICS & GYNECOLOGY

## 2021-04-25 PROCEDURE — 6360000002 HC RX W HCPCS: Performed by: INTERNAL MEDICINE

## 2021-04-25 PROCEDURE — 2500000003 HC RX 250 WO HCPCS: Performed by: INTERNAL MEDICINE

## 2021-04-25 PROCEDURE — 99232 SBSQ HOSP IP/OBS MODERATE 35: CPT | Performed by: INTERNAL MEDICINE

## 2021-04-25 PROCEDURE — 6370000000 HC RX 637 (ALT 250 FOR IP): Performed by: INTERNAL MEDICINE

## 2021-04-25 PROCEDURE — 99233 SBSQ HOSP IP/OBS HIGH 50: CPT | Performed by: INTERNAL MEDICINE

## 2021-04-25 PROCEDURE — 80048 BASIC METABOLIC PNL TOTAL CA: CPT

## 2021-04-25 PROCEDURE — 36415 COLL VENOUS BLD VENIPUNCTURE: CPT

## 2021-04-25 PROCEDURE — 6370000000 HC RX 637 (ALT 250 FOR IP): Performed by: STUDENT IN AN ORGANIZED HEALTH CARE EDUCATION/TRAINING PROGRAM

## 2021-04-25 PROCEDURE — 2060000000 HC ICU INTERMEDIATE R&B

## 2021-04-25 RX ORDER — LABETALOL 200 MG/1
200 TABLET, FILM COATED ORAL EVERY 12 HOURS SCHEDULED
Status: DISCONTINUED | OUTPATIENT
Start: 2021-04-25 | End: 2021-04-26

## 2021-04-25 RX ORDER — HYDRALAZINE HYDROCHLORIDE 20 MG/ML
10 INJECTION INTRAMUSCULAR; INTRAVENOUS EVERY 6 HOURS PRN
Status: DISCONTINUED | OUTPATIENT
Start: 2021-04-25 | End: 2021-04-26 | Stop reason: HOSPADM

## 2021-04-25 RX ADMIN — HYDROCHLOROTHIAZIDE 25 MG: 25 TABLET ORAL at 08:02

## 2021-04-25 RX ADMIN — NIFEDIPINE 90 MG: 30 TABLET, EXTENDED RELEASE ORAL at 08:02

## 2021-04-25 RX ADMIN — FERROUS SULFATE TAB 325 MG (65 MG ELEMENTAL FE) 325 MG: 325 (65 FE) TAB at 17:11

## 2021-04-25 RX ADMIN — LABETALOL HYDROCHLORIDE 10 MG: 5 INJECTION INTRAVENOUS at 06:06

## 2021-04-25 RX ADMIN — BUPRENORPHINE 8 MG: 8 TABLET SUBLINGUAL at 08:02

## 2021-04-25 RX ADMIN — LABETALOL HYDROCHLORIDE 200 MG: 200 TABLET, FILM COATED ORAL at 21:23

## 2021-04-25 RX ADMIN — ACETAMINOPHEN 500 MG: 500 TABLET ORAL at 21:27

## 2021-04-25 RX ADMIN — FERROUS SULFATE TAB 325 MG (65 MG ELEMENTAL FE) 325 MG: 325 (65 FE) TAB at 08:02

## 2021-04-25 RX ADMIN — LABETALOL HYDROCHLORIDE 200 MG: 200 TABLET, FILM COATED ORAL at 08:03

## 2021-04-25 RX ADMIN — SPIRONOLACTONE 25 MG: 25 TABLET ORAL at 08:02

## 2021-04-25 RX ADMIN — ACETAMINOPHEN 500 MG: 500 TABLET ORAL at 08:02

## 2021-04-25 ASSESSMENT — PAIN SCALES - GENERAL
PAINLEVEL_OUTOF10: 0
PAINLEVEL_OUTOF10: 7
PAINLEVEL_OUTOF10: 7

## 2021-04-25 ASSESSMENT — PAIN DESCRIPTION - LOCATION: LOCATION: HEAD

## 2021-04-25 ASSESSMENT — PAIN DESCRIPTION - PAIN TYPE: TYPE: ACUTE PAIN

## 2021-04-25 NOTE — PROGRESS NOTES
[facility]    Admitting Date and Time: 4/21/2021  7:04 AM  Admit Dx: No prenatal care in current pregnancy in third trimester [O09.33]    Subjective: Following for medical management, admitted in the morning of 21st, following delivery at home, patient came with placenta in situ, near term, history of hypertrophic cardiomyopathy, possible preeclampsia, noted with hypertensive urgency, BP on presentation to 53/141. Initially admitted to ICU. Seen by critical care as well as cardiology. Cardiology managing BP control. Patient is on Norvasc 5 as well as HCTZ 25 daily. Abnormal tox on presentation. Urine tox positive for amphetamines, cannabinoids, opiates, fentanyl, cocaine metabolites. Patient initially treated with IV magnesium. Did speak to high risk OB today, they will be available if needed otherwise most management is medical.  As per  baby cannot be discharged home until [facility] children services provides disposition. Patient with complaint of generalized body ache. BP not well controlled, as of today patient is on Aldactone 25 daily, HCTZ 25 daily, also on nifedipine 90 daily. Social work trying to set up patient for [employer]. Earliest appointment is on Monday. Still BP uncontrolled. Last 24 hours with still systolic BP continuously more than 170. Patient is started on labetalol 200 twice daily as of today from cardiology. Does remain on HCTZ, nifedipine, Aldactone. Patient was admitted with No prenatal care in current pregnancy in third trimester [O09.33]. Patient is awake, alert, comfortable, eating a lollipop, wants to take shower. Per RN: Can patient take shower, this was okayed. ROS: denies fever, chills, cp, sob, n/v, HA unless stated above.      labetalol  200 mg Oral 2 times per day    NIFEdipine  90 mg Oral Daily    spironolactone  25 mg Oral Daily    buprenorphine  8 mg Sublingual Daily

## 2021-04-25 NOTE — PROGRESS NOTES
INPATIENT CARDIOLOGY FOLLOW-UP    Name: Ramila Gamez    Age: 39 y.o. Date of Admission: 4/21/2021  7:04 AM    Date of Service: 4/25/2021    Chief Complaint: Follow-up for hypertension    Interim History:  No new overnight cardiac complaints. Currently with no complaints of CP, SOB, palpitations, dizziness, or lightheadedness. SR on telemetry.     Review of Systems:   Cardiac: As per HPI  General: No fever, chills  Pulmonary: As per HPI  HEENT: No visual disturbances, difficult swallowing  GI: No nausea, vomiting  Endocrine: No thyroid disease or DM  Musculoskeletal: SILVERMAN x 4, no focal motor deficits  Skin: Intact, no rashes  Neuro/Psych: No headache or seizures    Problem List:  Patient Active Problem List   Diagnosis    Right ankle sprain    Hypertension    Hypertensive emergency    H[facility]M [facility]rophic obstructive cardiomyopathy) ([facility] [address]    Hypokalemia    Acute cystitis without hematuria    No prenatal care in current pregnancy in third trimester    Postpartum examination following vaginal delivery    Hypertensive crisis    Pre-eclampsia, postpartum       Allergies:  No Known Allergies    Current Medications:  Current Facility-Administered Medications   Medication Dose Route Frequency Provider Last Rate Last Admin    labetalol (NORMODYNE) tablet 200 mg  200 mg Oral 2 times per day Tramaine Harrington MD   200 mg at 04/25/21 0803    NIFEdipine (ADALAT CC) extended release tablet 90 mg  90 mg Oral Daily Cooper Kuhn MD   90 mg at 04/25/21 0802    spironolactone (ALDACTONE) tablet 25 mg  25 mg Oral Daily Cooper Kuhn MD   25 mg at 04/25/21 0802    buprenorphine (SUBUTEX) SL tablet 8 mg  8 mg Sublingual Daily Pinky Vital MD   8 mg at 04/25/21 0802    hydroCHLOROthiazide (HYDRODIURIL) tablet 25 mg  25 mg Oral Daily Cooper Kuhn MD   25 mg at 04/25/21 0802    acetaminophen (TYLENOL) tablet 500 mg  500 mg Oral Q6H PRN Shahana Velazquez MD   500 mg at 04/25/21 0802    lansinoh lanolin ointment   Topical PRN Asher Bains, DO        witch hazel-glycerin (TUCKS) pad   Topical PRN Juan Veloz, DO        benzocaine-menthol (DERMOPLAST) 20-0.5 % spray   Topical PRN Asher Bains, DO        ferrous sulfate (IRON 325) tablet 325 mg  325 mg Oral BID WC Carmine Veloz, DO   325 mg at 04/25/21 0802    measles, mumps & rubella vaccine (MMR) injection 0.5 mL  0.5 mL Subcutaneous Prior to discharge Asher Bains, DO        Tetanus-Diphth-Acell Pertussis (BOOSTRIX) injection 0.5 mL  0.5 mL Intramuscular Prior to discharge Asher Bains, DO        calcium gluconate 10 % injection 1,000 mg  1,000 mg Intravenous PRN Asher Bains, DO        perflutren lipid microspheres (DEFINITY) injection 1.65 mg  1.5 mL Intravenous ONCE PRN Asher Bains, DO        labetalol (NORMODYNE;TRANDATE) injection 10 mg  10 mg Intravenous Q4H PRN Martinez Roberto Veloz, DO   10 mg at 04/25/21 0606    hydrALAZINE (APRESOLINE) injection 10 mg  10 mg Intravenous Q2H PRN Martinez Roberto Veloz, DO   10 mg at 04/22/21 2313         Physical Exam:  BP (!) 175/96   Pulse 100   Temp 97.7 °F (36.5 °C) (Oral)   Resp 16   Ht 5' 7\" (1.702 m)   Wt 127 lb (57.6 kg)   SpO2 97%   BMI 19.89 kg/m²   Wt Readings from Last 3 Encounters:   04/25/21 127 lb (57.6 kg)   12/12/17 177 lb (80.3 kg)   12/12/17 177 lb (80.3 kg)     Appearance: Awake, alert, no acute respiratory distress  Skin: Intact, no rash  Head: Normocephalic, atraumatic  Eyes: EOMI, no conjunctival erythema  ENMT: No pharyngeal erythema, MMM, no rhinorrhea  Neck: Supple, no elevated JVP, no carotid bruits  Lungs: Clear to auscultation bilaterally. No wheezes, rales, or rhonchi.   Cardiac: Regular rate and rhythm, +S1S2, no murmurs apparent  Abdomen: Soft, nontender, +bowel sounds  Extremities: Moves all extremities x 4, no lower extremity edema  Neurologic: No focal motor deficits apparent, normal mood and affect  Peripheral Pulses: Intact posterior tibial pulses bilaterally    Intake/Output:  No intake or output data in the 24 hours ending 04/25/21 0817  No intake/output data recorded. Laboratory Tests:  Recent Labs     04/23/21  0610 04/24/21  0630 04/25/21  0550    137 136   K 3.7 4.0 4.0   * 104 100   CO2 23 26 27   BUN 7 8 14   CREATININE 0.6 0.6 0.7   GLUCOSE 83 86 77   CALCIUM 8.4* 8.6 8.6     Lab Results   Component Value Date    MG 1.9 04/25/2021     No results for input(s): ALKPHOS, ALT, AST, PROT, BILITOT, BILIDIR, LABALBU in the last 72 hours. Recent Labs     04/23/21  0610 04/24/21  0630 04/25/21  0550   WBC 12.8* 13.3* 14.2*   RBC 3.54 3.72 3.69   HGB 9.9* 11.1* 10.4*   HCT 30.7* 32.1* 31.9*   MCV 86.7 86.3 86.4   MCH 28.0 29.8 28.2   MCHC 32.2 34.6* 32.6   RDW 13.5 13.6 13.5    319 317   MPV 10.8 10.3 10.1     Lab Results   Component Value Date    CKTOTAL 64 09/18/2013    CKMB 0.9 09/18/2013    TROPONINI <0.01 12/12/2017    TROPONINI <0.01 04/04/2017    TROPONINI <0.01 04/19/2016     Lab Results   Component Value Date    INR 0.9 04/21/2021    PROTIME 9.7 04/21/2021     Lab Results   Component Value Date    TSH 2.075 09/18/2013     Lab Results   Component Value Date    LABA1C 4.8 04/21/2021     No results found for: EAG  Lab Results   Component Value Date    CHOL 274 (H) 09/19/2013     Lab Results   Component Value Date    TRIG 259 (H) 09/19/2013     Lab Results   Component Value Date    HDL 83.0 09/19/2013     Lab Results   Component Value Date    LDLCALC 139 (H) 09/19/2013     No results found for: LABVLDL, VLDL  No results found for: CHOLHDLRATIO    Cardiac Tests:  ECG: Normal sinus rhythm, prolonged QT interval nonspecific T wave normality, abnormal EKG. Telemetry findings reviewed: Sinus tachycardia with heart rate in the 100s. Vitals and labs blood pressure still high at 175/96, labs are normal.  Except for WBC of 14.2 hemoglobin 10.4    Echocardiogram-4/21/2021:   Summary   Severe left ventricle hypertrophy.    Normal left ventricular systolic function. Visually estimated LVEF is 60-65 %. No wall motion abnormalities. Diastolic function is indeterminate. Normal right ventricle structure and function. No significant valvular abnormalities. ASSESSMENT:  · Hypertensive emergency, improving but remains uncontrolled  · History of hypertrophic cardiomyopathy  · Preeclampsia, postpartum   · postpartum vaginal delivery second retained placenta  · Polysubstance abuse including amphetamines THC, opiates, cocaine and fentanyl. · Mild anemia    Plan:   · Blood pressure remains poorly controlled and tachycardic. She is already on hydrochlorothiazide, nifedipine XL and spironolactone. She also has underlying hypertrophic cardiomyopathy diagnosis. I would strongly recommend adding a beta-blocker. Increase labetalol to 200 mg p.o. twice daily. If blood pressure remains uncontrolled then we can consider adding clonidine 0.1 mg p.o. twice daily which I would like to avoid in her case. · She was strongly encouraged to avoid substance abuse  · We will avoid ACEI/ARB due to her unwillingness to avoid future pregnancies. Christy Dunlap MD., Michel Mcmullen.   [facility]) Cardiology

## 2021-04-25 NOTE — PROGRESS NOTES
Patient fundus firm, at umbilicus. Patient lochia scant on pad. Patient has no complaints at this time.  [facility] Reza

## 2021-04-25 NOTE — PROGRESS NOTES
Department of Obstetrics and Gynecology  Labor and Delivery  Attending Post Partum Progress Note      SUBJECTIVE:  Doing well with no complaints  OBJECTIVE:      Vitals:  /78   Pulse 101   Temp 98.7 °F (37.1 °C) (Oral)   Resp 15   Ht 5' 7\" (1.702 m)   Wt 127 lb (57.6 kg)   SpO2 98%   BMI 19.89 kg/m²     ABDOMEN:  Soft, well contracted uterus   Lochia: Normal  No calf tenderness    DATA:    CBC:    Lab Results   Component Value Date    WBC 14.2 04/25/2021    RBC 3.69 04/25/2021    HGB 10.4 04/25/2021    HCT 31.9 04/25/2021    MCV 86.4 04/25/2021    RDW 13.5 04/25/2021     04/25/2021       ASSESSMENT & PLAN:      IMP:  1. PPD#4, status-post vaginal delivery            2.  Untreated (due to non-compliance) essential hypertension with hypertensive crisis, no further signs of preeclampsia nor HELLP syndrome           3. Polysubstance abuse contributing to above, currently experiencing withdrawal symptoms           4. Hypertrophic cardiomyopathy           5.   Chronic anemia    Plan:  Can be discharged from [employer] standpoint  Follow-up in the clinic in about 6 weeks  Further management by medical team

## 2021-04-26 VITALS
OXYGEN SATURATION: 99 % | WEIGHT: 128.8 LBS | RESPIRATION RATE: 16 BRPM | HEIGHT: 67 IN | DIASTOLIC BLOOD PRESSURE: 58 MMHG | HEART RATE: 90 BPM | SYSTOLIC BLOOD PRESSURE: 109 MMHG | BODY MASS INDEX: 20.21 KG/M2 | TEMPERATURE: 98.6 F

## 2021-04-26 LAB
6AM URINE: 56 NG/ML
AMPHETAMINE SCREEN, URINE: NOT DETECTED
ANION GAP SERPL CALCULATED.3IONS-SCNC: 7 MMOL/L (ref 7–16)
BARBITURATE SCREEN URINE: NOT DETECTED
BASOPHILS ABSOLUTE: 0.08 E9/L (ref 0–0.2)
BASOPHILS RELATIVE PERCENT: 0.7 % (ref 0–2)
BENZODIAZEPINE SCREEN, URINE: NOT DETECTED
BUN BLDV-MCNC: 15 MG/DL (ref 6–20)
CALCIUM SERPL-MCNC: 8.4 MG/DL (ref 8.6–10.2)
CANNABINOID SCREEN URINE: NOT DETECTED
CHLORIDE BLD-SCNC: 103 MMOL/L (ref 98–107)
CO2: 28 MMOL/L (ref 22–29)
COCAINE METABOLITE SCREEN URINE: NOT DETECTED
COCAINE, CONFIRM, URINE: >1000 NG/ML
CODEINE, URINE: 25 NG/ML
CREAT SERPL-MCNC: 0.6 MG/DL (ref 0.5–1)
EOSINOPHILS ABSOLUTE: 0.48 E9/L (ref 0.05–0.5)
EOSINOPHILS RELATIVE PERCENT: 3.9 % (ref 0–6)
FENTANYL SCREEN, URINE: NOT DETECTED
GFR AFRICAN AMERICAN: >60
GFR NON-AFRICAN AMERICAN: >60 ML/MIN/1.73
GLUCOSE BLD-MCNC: 78 MG/DL (ref 74–99)
HCT VFR BLD CALC: 30.8 % (ref 34–48)
HEMOGLOBIN: 10.1 G/DL (ref 11.5–15.5)
HYDROCODONE, URINE: <20 NG/ML
HYDROMORPHONE, URINE: <20 NG/ML
IMMATURE GRANULOCYTES #: 0.11 E9/L
IMMATURE GRANULOCYTES %: 0.9 % (ref 0–5)
LYMPHOCYTES ABSOLUTE: 2.41 E9/L (ref 1.5–4)
LYMPHOCYTES RELATIVE PERCENT: 19.8 % (ref 20–42)
Lab: NORMAL
MAGNESIUM: 2.1 MG/DL (ref 1.6–2.6)
MCH RBC QN AUTO: 28.5 PG (ref 26–35)
MCHC RBC AUTO-ENTMCNC: 32.8 % (ref 32–34.5)
MCV RBC AUTO: 86.8 FL (ref 80–99.9)
METHADONE SCREEN, URINE: NOT DETECTED
MONOCYTES ABSOLUTE: 0.72 E9/L (ref 0.1–0.95)
MONOCYTES RELATIVE PERCENT: 5.9 % (ref 2–12)
MORPHINE URINE: 398 NG/ML
NEUTROPHILS ABSOLUTE: 8.36 E9/L (ref 1.8–7.3)
NEUTROPHILS RELATIVE PERCENT: 68.8 % (ref 43–80)
NORHYDROCODONE, URINE: <20 NG/ML
NOROXYCODONE, URINE: <20 NG/ML
NOROXYMORPHONE, URINE: <20 NG/ML
OPIATE SCREEN URINE: NOT DETECTED
OXYCODONE URINE: NOT DETECTED
OXYCODONE, URINE CONFIRMATION: <20 NG/ML
OXYMORPHONE, URINE: <20 NG/ML
PDW BLD-RTO: 13.3 FL (ref 11.5–15)
PHENCYCLIDINE SCREEN URINE: NOT DETECTED
PLATELET # BLD: 311 E9/L (ref 130–450)
PMV BLD AUTO: 10 FL (ref 7–12)
POTASSIUM SERPL-SCNC: 4.2 MMOL/L (ref 3.5–5)
RBC # BLD: 3.55 E12/L (ref 3.5–5.5)
SODIUM BLD-SCNC: 138 MMOL/L (ref 132–146)
WBC # BLD: 12.2 E9/L (ref 4.5–11.5)

## 2021-04-26 PROCEDURE — 6370000000 HC RX 637 (ALT 250 FOR IP): Performed by: INTERNAL MEDICINE

## 2021-04-26 PROCEDURE — 80307 DRUG TEST PRSMV CHEM ANLYZR: CPT

## 2021-04-26 PROCEDURE — 99233 SBSQ HOSP IP/OBS HIGH 50: CPT | Performed by: INTERNAL MEDICINE

## 2021-04-26 PROCEDURE — 6360000002 HC RX W HCPCS: Performed by: INTERNAL MEDICINE

## 2021-04-26 PROCEDURE — 80048 BASIC METABOLIC PNL TOTAL CA: CPT

## 2021-04-26 PROCEDURE — 36415 COLL VENOUS BLD VENIPUNCTURE: CPT

## 2021-04-26 PROCEDURE — 83735 ASSAY OF MAGNESIUM: CPT

## 2021-04-26 PROCEDURE — 6370000000 HC RX 637 (ALT 250 FOR IP): Performed by: STUDENT IN AN ORGANIZED HEALTH CARE EDUCATION/TRAINING PROGRAM

## 2021-04-26 PROCEDURE — 99232 SBSQ HOSP IP/OBS MODERATE 35: CPT | Performed by: INTERNAL MEDICINE

## 2021-04-26 PROCEDURE — 85025 COMPLETE CBC W/AUTO DIFF WBC: CPT

## 2021-04-26 RX ORDER — SPIRONOLACTONE 25 MG/1
25 TABLET ORAL DAILY
Qty: 30 TABLET | Refills: 1 | Status: SHIPPED | OUTPATIENT
Start: 2021-04-27 | End: 2022-06-15

## 2021-04-26 RX ORDER — NIFEDIPINE 90 MG/1
90 TABLET, EXTENDED RELEASE ORAL DAILY
Qty: 30 TABLET | Refills: 1 | Status: SHIPPED | OUTPATIENT
Start: 2021-04-27 | End: 2022-06-15

## 2021-04-26 RX ORDER — LABETALOL 300 MG/1
300 TABLET, FILM COATED ORAL EVERY 12 HOURS SCHEDULED
Qty: 60 TABLET | Refills: 1 | Status: SHIPPED | OUTPATIENT
Start: 2021-04-26 | End: 2022-06-15

## 2021-04-26 RX ORDER — FERROUS SULFATE 325(65) MG
325 TABLET ORAL 2 TIMES DAILY WITH MEALS
Qty: 30 TABLET | Refills: 1 | Status: SHIPPED | OUTPATIENT
Start: 2021-04-26 | End: 2022-06-15

## 2021-04-26 RX ORDER — LABETALOL 200 MG/1
300 TABLET, FILM COATED ORAL EVERY 12 HOURS SCHEDULED
Status: DISCONTINUED | OUTPATIENT
Start: 2021-04-26 | End: 2021-04-26 | Stop reason: HOSPADM

## 2021-04-26 RX ORDER — HYDROCHLOROTHIAZIDE 25 MG/1
25 TABLET ORAL DAILY
Qty: 30 TABLET | Refills: 1 | Status: SHIPPED | OUTPATIENT
Start: 2021-04-27 | End: 2022-06-15

## 2021-04-26 RX ADMIN — FERROUS SULFATE TAB 325 MG (65 MG ELEMENTAL FE) 325 MG: 325 (65 FE) TAB at 07:43

## 2021-04-26 RX ADMIN — HYDROCHLOROTHIAZIDE 25 MG: 25 TABLET ORAL at 07:43

## 2021-04-26 RX ADMIN — NIFEDIPINE 90 MG: 30 TABLET, EXTENDED RELEASE ORAL at 07:44

## 2021-04-26 RX ADMIN — LABETALOL HYDROCHLORIDE 300 MG: 200 TABLET, FILM COATED ORAL at 07:44

## 2021-04-26 RX ADMIN — SPIRONOLACTONE 25 MG: 25 TABLET ORAL at 07:43

## 2021-04-26 RX ADMIN — HYDRALAZINE HYDROCHLORIDE 10 MG: 20 INJECTION INTRAMUSCULAR; INTRAVENOUS at 06:15

## 2021-04-26 RX ADMIN — BUPRENORPHINE 8 MG: 8 TABLET SUBLINGUAL at 07:43

## 2021-04-26 ASSESSMENT — PAIN SCALES - GENERAL: PAINLEVEL_OUTOF10: 3

## 2021-04-26 NOTE — PROGRESS NOTES
INPATIENT CARDIOLOGY FOLLOW-UP    Name: Arsenio Flores    Age: 39 y.o. Date of Admission: 4/21/2021  7:04 AM    Date of Service: 4/26/2021    Chief Complaint: Follow-up for hypertension, HCM    Interim History:  No new overnight cardiac complaints. Currently with no complaints of CP, SOB, palpitations, dizziness, or lightheadedness. SR on telemetry. She wants to go home.     Review of Systems:   Cardiac: As per HPI  General: No fever, chills  Pulmonary: As per HPI  HEENT: No visual disturbances, difficult swallowing  GI: No nausea, vomiting  : No dysuria, hematuria  Endocrine: No thyroid disease or DM  Musculoskeletal: SILVERMAN x 4, no focal motor deficits  Skin: Intact, no rashes  Neuro: No headache, seizures  Psych: Currently with no depression, anxiety    Problem List:  Patient Active Problem List   Diagnosis    Right ankle sprain    Hypertension    Hypertensive emergency    HOCM (hypertrophic obstructive cardiomyopathy) ([facility] [address]    Hypokalemia    Acute cystitis without hematuria    No prenatal care in current pregnancy in third trimester    Postpartum examination following vaginal delivery    Hypertensive crisis    Pre-eclampsia, postpartum       Allergies:  No Known Allergies    Current Medications:  Current Facility-Administered Medications   Medication Dose Route Frequency Provider Last Rate Last Admin    labetalol (NORMODYNE) tablet 200 mg  200 mg Oral 2 times per day Mike Choi MD   200 mg at 04/25/21 2123    hydrALAZINE (APRESOLINE) injection 10 mg  10 mg Intravenous Q6H PRN Mike Choi MD        NIFEdipine (ADALAT CC) extended release tablet 90 mg  90 mg Oral Daily Hay Duran MD   90 mg at 04/25/21 0802    spironolactone (ALDACTONE) tablet 25 mg  25 mg Oral Daily Hay Duran MD   25 mg at 04/25/21 0802    buprenorphine (SUBUTEX) SL tablet 8 mg  8 mg Sublingual Daily Pinky Recinos MD   8 mg at 04/25/21 0802    hydroCHLOROthiazide (HYDRODIURIL) tablet 25 mg 25 mg Oral Daily Jacqueline Kingston MD   25 mg at 04/25/21 0802    acetaminophen (TYLENOL) tablet 500 mg  500 mg Oral Q6H PRN Fareed Lobato MD   500 mg at 04/25/21 2127    lansinoh lanolin ointment   Topical PRN Sarah Zaragoza DO        witch hazel-glycerin (TUCKS) pad   Topical PRN Danna Veloz,         benzocaine-menthol (DERMOPLAST) 20-0.5 % spray   Topical PRN Sarah Zaragoza DO        ferrous sulfate (IRON 325) tablet 325 mg  325 mg Oral BID WC Carmine Veloz, DO   325 mg at 04/25/21 1711    measles, mumps & rubella vaccine (MMR) injection 0.5 mL  0.5 mL Subcutaneous Prior to discharge Sarah Zaragoza DO        Tetanus-Diphth-Acell Pertussis (BOOSTRIX) injection 0.5 mL  0.5 mL Intramuscular Prior to discharge Sarah Zaragoza DO        calcium gluconate 10 % injection 1,000 mg  1,000 mg Intravenous PRN Danna Veloz, DO        perflutren lipid microspheres (DEFINITY) injection 1.65 mg  1.5 mL Intravenous ONCE PRN Sarah Zaragoza DO             Physical Exam:  BP (!) 172/98   Pulse 85   Temp 97.8 °F (36.6 °C) (Oral)   Resp 16   Ht 5' 7\" (1.702 m)   Wt 128 lb 12.8 oz (58.4 kg)   SpO2 99%   BMI 20.17 kg/m²   Wt Readings from Last 3 Encounters:   04/26/21 128 lb 12.8 oz (58.4 kg)   12/12/17 177 lb (80.3 kg)   12/12/17 177 lb (80.3 kg)     Appearance: Awake, alert, no acute respiratory distress  Skin: Intact, no rash  Head: Normocephalic, atraumatic  Eyes: EOMI, no conjunctival erythema  ENMT: No pharyngeal erythema, MMM, no rhinorrhea  Neck: Supple, no elevated JVP, no carotid bruits  Lungs: Clear to auscultation bilaterally. No wheezes, rales, or rhonchi.   Cardiac: Regular rate and rhythm, +S1S2, no murmurs apparent  Abdomen: Soft, nontender, +bowel sounds  Extremities: Moves all extremities x 4, no lower extremity edema  Neurologic: No focal motor deficits apparent, normal mood and affect  Peripheral Pulses: Intact posterior tibial pulses bilaterally    Intake/Output:    Intake/Output Summary function. No significant valvular abnormalities. ASSESSMENT:  · Hypertensive emergency, improving but remains uncontrolled  · History of hypertrophic cardiomyopathy  · Preeclampsia, postpartum   · Postpartum vaginal delivery / retained placenta  · Polysubstance abuse including amphetamines THC, opiates, cocaine and fentanyl. · Mild anemia    Plan:   · Blood pressure remains poorly controlled and tachycardic. · Will increase labetalol to 300 mg p.o. twice daily  · Continue current anti-HTN agents otherwise  · She was strongly encouraged to avoid substance abuse  · We will avoid ACEI/ARB due to her unwillingness to avoid future pregnancies  · No further inpatient cardiac work-up planned    Greater than 35 minutes was spent counseling the patient, reviewing the rationale for the above recommendations and reviewing the patient's current medication list, problem list and results of all previously ordered testing.     Cecy Norman MD  [facility]) Cardiology

## 2021-04-26 NOTE — PLAN OF CARE
Problem: Physical Regulation:  Goal: Complications related to the disease process, condition or treatment will be avoided or minimized  Description: Complications related to the disease process, condition or treatment will be avoided or minimized  Outcome: Met This Shift     Problem: Pain:  Goal: Pain level will decrease  Description: Pain level will decrease  Outcome: Met This Shift  Goal: Control of acute pain  Description: Control of acute pain  Outcome: Met This Shift  Goal: Control of chronic pain  Description: Control of chronic pain  Outcome: Met This Shift     Problem: Falls - Risk of:  Goal: Will remain free from falls  Description: Will remain free from falls  Outcome: Met This Shift  Goal: Absence of physical injury  Description: Absence of physical injury  Outcome: Met This Shift

## 2021-04-26 NOTE — PROGRESS NOTES
[facility] Hospitalist   Progress Note    Admitting Date and Time: 4/21/2021  7:04 AM  Admit Dx: No prenatal care in current pregnancy in third trimester [O09.33]    Subjective: Following for medical management, admitted in the morning of 21st, following delivery at home, patient came with placenta in situ, near term, history of hypertrophic cardiomyopathy, possible preeclampsia, noted with hypertensive urgency, BP on presentation to 53/141. Initially admitted to ICU. Seen by critical care as well as cardiology. Cardiology managing BP control. Patient is on Norvasc 5 as well as HCTZ 25 daily. Abnormal tox on presentation. Urine tox positive for amphetamines, cannabinoids, opiates, fentanyl, cocaine metabolites. Patient initially treated with IV magnesium. Did speak to high risk OB today, they will be available if needed otherwise most management is medical.  As per  baby cannot be discharged home until [facility] children services provides disposition. Patient with complaint of generalized body ache. BP not well controlled, as of today patient is on Aldactone 25 daily, HCTZ 25 daily, also on nifedipine 90 daily. Social work trying to set up patient for [facility]. Earliest appointment is on Monday. Still BP uncontrolled. Last 24 hours with still systolic BP continuously more than 170. Labetalol is increased to 300 twice daily as of today from cardiology as BP not well controlled. Does remain on HCTZ, nifedipine, Aldactone. As per psych input psych consult not indicated. Patient can be discharged from psych point. Patient is awake, alert, comfortable, has received morning Subutex. Patient is okay for DC from cardiac side she does say that she will be able to get her prescription filled. Nursing were updated to let OB service know patient is okay for DC from medicine.     Patient was admitted with No prenatal care in current pregnancy in third  100 103   CO2 26 27 28   BUN 8 14 15   CREATININE 0.6 0.7 0.6   GLUCOSE 86 77 78   CALCIUM 8.6 8.6 8.4*       Recent Labs     04/24/21  0630 04/25/21  0550 04/26/21  0305   WBC 13.3* 14.2* 12.2*   RBC 3.72 3.69 3.55   HGB 11.1* 10.4* 10.1*   HCT 32.1* 31.9* 30.8*   MCV 86.3 86.4 86.8   MCH 29.8 28.2 28.5   MCHC 34.6* 32.6 32.8   RDW 13.6 13.5 13.3    317 311   MPV 10.3 10.1 10.0       Radiology:   No orders to display       Assessment:    Active Problems:    No prenatal care in current pregnancy in third trimester    Postpartum examination following vaginal delivery    Hypertensive crisis    Pre-eclampsia, postpartum  Resolved Problems:    * No resolved hospital problems. *      Plan:  1. Hypertensive urgency, BP has shown improvement, still not controlled. Will remain on labetalol, Aldactone, nifedipine, as well as HCTZ. 2. Abnormal tox screen, substance abuse, patient is started on Subutex replacement, do have significant relief, patient does plan to go to [place] outpatient services, first appointment available Monday. As per psych inpatient psych input not needed. 3. Okay for mother to visit the baby, patient is aware of baby having withdrawal.  4. Leukocytosis, patient afebrile, just observe. 5. Patient okay for DC, nursing to update primary service.         Electronically signed by Chrystal Vicente MD on 4/26/2021 at 8:24 AM

## 2021-04-26 NOTE — CONSULTS
Consult received for \"substance abuse\" although this is really not an indication for inpatient psychiatric consultation. Chart reviewed. Patient is pending discharge today and Heidi has been working with her closely to arrange follow-up after discharge. Otherwise no acute psychiatric needs identified and patient can be discharged from my standpoint.     Electronically signed by Wm Houser MD on 4/26/2021 at 9:19 AM

## 2021-04-26 NOTE — PROGRESS NOTES
Patient unable to have payment on hand for med pickup from meds to beds program. Patient states she does not want anyone else to bring her money that she will return to  meds from outpatient pharmacy. RN offered patient to outsource to another pharmacy for pickup. Patient states \"I am coming back to visit my daughter so I will run home and get my card then be back to  the medication. \" Patient made aware that pharmacy closes at 5 pm this evening. Patient agreeable to return by 5 to  medication with her card for payment.

## 2021-04-26 NOTE — PROGRESS NOTES
Called consult information to [facility] for Dr. Lou Sen.  Electronically signed by Omar Davidson RN on 4/26/2021 at 9:03 AM

## 2021-04-28 NOTE — PROGRESS NOTES
Called patient on 4/28/21 at 12:10 pm. Per patient, Andrey Pena will eventually  her discharged medications as soon as she finds her bank card\".

## 2021-04-29 NOTE — PROGRESS NOTES
CLINICAL PHARMACY NOTE: MEDS TO [address] Select Patient?: No  Total # of Prescriptions Filled: 4   The following medications were delivered to the patient:  · Labetalol 300 mg  · Nifedipine er 90 mg  · Spironolactone 25 mg  · Hydrochlorothiazide 25 mg   Total # of Interventions Completed: 3  Time Spent (min): 30    Additional Documentation:

## 2021-05-12 NOTE — DISCHARGE SUMMARY
Obstetric Discharge Summary    Patient Name:  Marky Alcocer    Medical Record Number:  [id number]    Attending:  Chris Anders    Date of Admission:  2021    Date of Discharge:  2021      Admitting Diagnosis  S/p Home delivery with retained placenta  Limited prenatal care  Untreated essential hypertension with hypertensive crisis  Polysubstance abuse  Hypertrophic cardiomyopathy  Chronic anemia    OB History        2    Para   1    Term                AB        Living   1       SAB        TAB        Ectopic        Molar        Multiple        Live Births   1          Obstetric Comments   Jay scoring 36-38 weeks gestation              Reasons for Admission on 2021  7:04 AM  Status post home delivery with retained placenta  Hypertensive crisis  Polysubstance abuse  Hypertrophic cardiomyopathy  Chronic anemia    Procedures  Post-partum dilatation and curettage  Transfer to ICU for management of hypertensive crisis         Postpartum Course  As above  Hypertension/cardiomyopathy managed by [facility] IM and Cardiology consultations    [place] Data  Near term/term liveborn fetus    Discharge Diagnosis   Same         Discharge Meds:       Medication List      START taking these medications    ferrous sulfate 325 (65 Fe) MG tablet  Commonly known as: IRON 325  Take 1 tablet by mouth 2 times daily (with meals)     hydroCHLOROthiazide 25 MG tablet  Commonly known as: HYDRODIURIL  Take 1 tablet by mouth daily     labetalol 300 MG tablet  Commonly known as: NORMODYNE  Take 1 tablet by mouth every 12 hours     NIFEdipine 90 MG extended release tablet  Commonly known as: PROCARDIA XL  Take 1 tablet by mouth daily     spironolactone 25 MG tablet  Commonly known as: ALDACTONE  Take 1 tablet by mouth daily        STOP taking these medications    acetaminophen 500 MG tablet  Commonly known as: TYLENOL     amLODIPine 10 MG tablet  Commonly known as: Norvasc     aspirin 325 MG tablet buprenorphine-naloxone 8-2 MG Subl SL tablet  Commonly known as: SUBOXONE     ibuprofen 800 MG tablet  Commonly known as: ADVIL;MOTRIN     pseudoephedrine 120 MG extended release tablet  Commonly known as: SUDAFED 12 HR           Where to Get Your Medications      These medications were sent to [address] [place] [address] [phone] - F [phone]  Rod Aguilar, [address] [facility] [address]    Phone: [phone]   · ferrous sulfate 325 (65 Fe) MG tablet  · hydroCHLOROthiazide 25 MG tablet  · labetalol 300 MG tablet  · NIFEdipine 90 MG extended release tablet  · spironolactone 25 MG tablet         Discharge Information  Discharge Medication List as of 4/26/2021 12:11 PM      START taking these medications    Details   NIFEdipine (PROCARDIA XL) 90 MG extended release tablet Take 1 tablet by mouth daily, Disp-30 tablet, R-1Normal      hydroCHLOROthiazide (HYDRODIURIL) 25 MG tablet Take 1 tablet by mouth daily, Disp-30 tablet, R-1Normal      spironolactone (ALDACTONE) 25 MG tablet Take 1 tablet by mouth daily, Disp-30 tablet, R-1Normal      ferrous sulfate (IRON 325) 325 (65 Fe) MG tablet Take 1 tablet by mouth 2 times daily (with meals), Disp-30 tablet, R-1Normal         CONTINUE these medications which have CHANGED    Details   labetalol (NORMODYNE) 300 MG tablet Take 1 tablet by mouth every 12 hours, Disp-60 tablet, R-1Normal         STOP taking these medications       pseudoephedrine (SUDAFED 12 HR) 120 MG extended release tablet Comments:   Reason for Stopping:         acetaminophen (TYLENOL) 500 MG tablet Comments:   Reason for Stopping:         aspirin 325 MG tablet Comments:   Reason for Stopping:         ibuprofen (ADVIL;MOTRIN) 800 MG tablet Comments:   Reason for Stopping:         buprenorphine-naloxone (SUBOXONE) 8-2 MG SUBL SL tablet Comments:   Reason for Stopping:         amLODIPine (NORVASC) 10 MG tablet Comments:   Reason for Stopping:         amLODIPine (NORVASC) 10 MG tablet Comments:   Reason for Stopping:               No discharge procedures on file.     Discharge to: Home  Follow up as directed        Comments

## 2022-06-15 ENCOUNTER — HOSPITAL ENCOUNTER (EMERGENCY)
Age: 37
Discharge: HOME | End: 2022-06-15
Payer: COMMERCIAL

## 2022-06-15 VITALS
WEIGHT: 130 LBS | HEIGHT: 67 IN | BODY MASS INDEX: 20.4 KG/M2 | DIASTOLIC BLOOD PRESSURE: 86 MMHG | TEMPERATURE: 98.3 F | SYSTOLIC BLOOD PRESSURE: 171 MMHG | OXYGEN SATURATION: 99 % | RESPIRATION RATE: 16 BRPM | HEART RATE: 78 BPM

## 2022-06-15 DIAGNOSIS — I10 UNCONTROLLED HYPERTENSION: Primary | ICD-10-CM

## 2022-06-15 DIAGNOSIS — Z76.0 ENCOUNTER FOR MEDICATION REFILL: ICD-10-CM

## 2022-06-15 LAB
ALBUMIN SERPL-MCNC: 4.1 G/DL (ref 3.5–5.2)
ALP BLD-CCNC: 72 U/L (ref 35–104)
ALT SERPL-CCNC: <5 U/L (ref 0–32)
ANION GAP SERPL CALCULATED.3IONS-SCNC: 14 MMOL/L (ref 7–16)
AST SERPL-CCNC: 11 U/L (ref 0–31)
BACTERIA: ABNORMAL /HPF
BASOPHILS ABSOLUTE: 0.1 E9/L (ref 0–0.2)
BASOPHILS RELATIVE PERCENT: 1 % (ref 0–2)
BILIRUB SERPL-MCNC: <0.2 MG/DL (ref 0–1.2)
BILIRUBIN URINE: NEGATIVE
BLOOD, URINE: ABNORMAL
BUN BLDV-MCNC: 15 MG/DL (ref 6–20)
CALCIUM SERPL-MCNC: 8.5 MG/DL (ref 8.6–10.2)
CHLORIDE BLD-SCNC: 101 MMOL/L (ref 98–107)
CLARITY: ABNORMAL
CO2: 24 MMOL/L (ref 22–29)
COLOR: YELLOW
CREAT SERPL-MCNC: 0.7 MG/DL (ref 0.5–1)
EOSINOPHILS ABSOLUTE: 0.23 E9/L (ref 0.05–0.5)
EOSINOPHILS RELATIVE PERCENT: 2.4 % (ref 0–6)
EPITHELIAL CELLS, UA: ABNORMAL /HPF
GFR AFRICAN AMERICAN: >60
GFR NON-AFRICAN AMERICAN: >60 ML/MIN/1.73
GLUCOSE BLD-MCNC: 161 MG/DL (ref 74–99)
GLUCOSE URINE: NEGATIVE MG/DL
HCG, URINE, POC: NEGATIVE
HCT VFR BLD CALC: 37.6 % (ref 34–48)
HEMOGLOBIN: 12.3 G/DL (ref 11.5–15.5)
IMMATURE GRANULOCYTES #: 0.03 E9/L
IMMATURE GRANULOCYTES %: 0.3 % (ref 0–5)
KETONES, URINE: NEGATIVE MG/DL
LEUKOCYTE ESTERASE, URINE: ABNORMAL
LYMPHOCYTES ABSOLUTE: 2.55 E9/L (ref 1.5–4)
LYMPHOCYTES RELATIVE PERCENT: 26.1 % (ref 20–42)
Lab: NORMAL
MCH RBC QN AUTO: 28.1 PG (ref 26–35)
MCHC RBC AUTO-ENTMCNC: 32.7 % (ref 32–34.5)
MCV RBC AUTO: 85.8 FL (ref 80–99.9)
MONOCYTES ABSOLUTE: 0.51 E9/L (ref 0.1–0.95)
MONOCYTES RELATIVE PERCENT: 5.2 % (ref 2–12)
NEGATIVE QC PASS/FAIL: NORMAL
NEUTROPHILS ABSOLUTE: 6.34 E9/L (ref 1.8–7.3)
NEUTROPHILS RELATIVE PERCENT: 65 % (ref 43–80)
NITRITE, URINE: NEGATIVE
PDW BLD-RTO: 12.1 FL (ref 11.5–15)
PH UA: 7.5 (ref 5–9)
PLATELET # BLD: 317 E9/L (ref 130–450)
PMV BLD AUTO: 10.6 FL (ref 7–12)
POSITIVE QC PASS/FAIL: NORMAL
POTASSIUM SERPL-SCNC: 4.2 MMOL/L (ref 3.5–5)
PROTEIN UA: NEGATIVE MG/DL
RBC # BLD: 4.38 E12/L (ref 3.5–5.5)
RBC UA: ABNORMAL /HPF (ref 0–2)
SODIUM BLD-SCNC: 139 MMOL/L (ref 132–146)
SPECIFIC GRAVITY UA: 1.02 (ref 1–1.03)
TOTAL PROTEIN: 6.5 G/DL (ref 6.4–8.3)
TROPONIN, HIGH SENSITIVITY: <6 NG/L (ref 0–9)
UROBILINOGEN, URINE: 0.2 E.U./DL
WBC # BLD: 9.8 E9/L (ref 4.5–11.5)
WBC UA: ABNORMAL /HPF (ref 0–5)

## 2022-06-15 PROCEDURE — 85025 COMPLETE CBC W/AUTO DIFF WBC: CPT

## 2022-06-15 PROCEDURE — 84484 ASSAY OF TROPONIN QUANT: CPT

## 2022-06-15 PROCEDURE — 99284 EMERGENCY DEPT VISIT MOD MDM: CPT

## 2022-06-15 PROCEDURE — 6370000000 HC RX 637 (ALT 250 FOR IP): Performed by: NURSE PRACTITIONER

## 2022-06-15 PROCEDURE — 93005 ELECTROCARDIOGRAM TRACING: CPT | Performed by: NURSE PRACTITIONER

## 2022-06-15 PROCEDURE — 81001 URINALYSIS AUTO W/SCOPE: CPT

## 2022-06-15 PROCEDURE — 80053 COMPREHEN METABOLIC PANEL: CPT

## 2022-06-15 RX ORDER — AMLODIPINE BESYLATE 5 MG/1
10 TABLET ORAL ONCE
Status: COMPLETED | OUTPATIENT
Start: 2022-06-15 | End: 2022-06-15

## 2022-06-15 RX ORDER — AMLODIPINE BESYLATE 10 MG/1
10 TABLET ORAL DAILY
Qty: 30 TABLET | Refills: 0 | Status: SHIPPED | OUTPATIENT
Start: 2022-06-15 | End: 2022-07-08 | Stop reason: SDUPTHER

## 2022-06-15 RX ORDER — AMLODIPINE BESYLATE 10 MG/1
10 TABLET ORAL DAILY
COMMUNITY
End: 2022-06-15 | Stop reason: SDUPTHER

## 2022-06-15 RX ORDER — CLONIDINE HYDROCHLORIDE 0.2 MG/1
0.2 TABLET ORAL 2 TIMES DAILY
Qty: 60 TABLET | Refills: 0 | Status: SHIPPED | OUTPATIENT
Start: 2022-06-15 | End: 2022-07-08 | Stop reason: SDUPTHER

## 2022-06-15 RX ORDER — CLONIDINE HYDROCHLORIDE 0.2 MG/1
0.2 TABLET ORAL 2 TIMES DAILY
COMMUNITY
End: 2022-06-15 | Stop reason: SDUPTHER

## 2022-06-15 RX ORDER — CLONIDINE HYDROCHLORIDE 0.1 MG/1
0.1 TABLET ORAL ONCE
Status: COMPLETED | OUTPATIENT
Start: 2022-06-15 | End: 2022-06-15

## 2022-06-15 RX ADMIN — AMLODIPINE BESYLATE 10 MG: 5 TABLET ORAL at 17:35

## 2022-06-15 RX ADMIN — CLONIDINE HYDROCHLORIDE 0.1 MG: 0.1 TABLET ORAL at 17:35

## 2022-06-15 ASSESSMENT — PAIN - FUNCTIONAL ASSESSMENT: PAIN_FUNCTIONAL_ASSESSMENT: NONE - DENIES PAIN
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
## 2022-06-15 NOTE — ED NOTES
Department of Emergency Medicine  FIRST PROVIDER TRIAGE NOTE             Independent MLP           6/15/22  3:57 PM EDT    Date of Encounter: 6/15/22   MRN: [id number]      HPI: Corie Pennington is a 44 y.o. female who presents to the ED for Hypertension (Pt has not taken BP meds for 1 week and was checking into [place] for rehab and BP high)      ROS: Negative for cp or sob. PE: Gen Appearance/Constitutional: alert  Musculoskeletal: moves all extremities x 4     Initial Plan of Care: All treatment areas with department are currently occupied. Plan to order/Initiate the following while awaiting opening in ED: labs and EKG.   Initiate Treatment-Testing, Proceed toTreatment Area When Bed Available for ED Attending/MLP to Continue Care    Electronically signed by CHALO Lares CNP   DD: 6/15/22       CHALO Aponte CNP  06/15/22 1552

## 2022-06-16 LAB
EKG ATRIAL RATE: 78 BPM
EKG P AXIS: 52 DEGREES
EKG P-R INTERVAL: 120 MS
EKG Q-T INTERVAL: 400 MS
EKG QRS DURATION: 98 MS
EKG QTC CALCULATION (BAZETT): 456 MS
EKG R AXIS: 65 DEGREES
EKG T AXIS: 75 DEGREES
EKG VENTRICULAR RATE: 78 BPM

## 2022-06-18 ENCOUNTER — APPOINTMENT (OUTPATIENT)
Dept: CT IMAGING | Age: 37
End: 2022-06-18
Payer: COMMERCIAL

## 2022-06-18 ENCOUNTER — HOSPITAL ENCOUNTER (EMERGENCY)
Age: 37
Discharge: LEFT BEFORE DISCHARGE | End: 2022-06-18
Attending: STUDENT IN AN ORGANIZED HEALTH CARE EDUCATION/TRAINING PROGRAM
Payer: COMMERCIAL

## 2022-06-18 ENCOUNTER — APPOINTMENT (OUTPATIENT)
Dept: GENERAL RADIOLOGY | Age: 37
End: 2022-06-18
Payer: COMMERCIAL

## 2022-06-18 VITALS
HEART RATE: 68 BPM | DIASTOLIC BLOOD PRESSURE: 87 MMHG | TEMPERATURE: 98.2 F | SYSTOLIC BLOOD PRESSURE: 125 MMHG | BODY MASS INDEX: 20.67 KG/M2 | RESPIRATION RATE: 16 BRPM | WEIGHT: 132 LBS | OXYGEN SATURATION: 98 %

## 2022-06-18 DIAGNOSIS — I10 HYPERTENSION, UNSPECIFIED TYPE: ICD-10-CM

## 2022-06-18 DIAGNOSIS — R94.31 ABNORMAL EKG: Primary | ICD-10-CM

## 2022-06-18 DIAGNOSIS — R51.9 ACUTE NONINTRACTABLE HEADACHE, UNSPECIFIED HEADACHE TYPE: ICD-10-CM

## 2022-06-18 LAB
ALBUMIN SERPL-MCNC: 4.2 G/DL (ref 3.5–5.2)
ALP BLD-CCNC: 81 U/L (ref 35–104)
ALT SERPL-CCNC: 14 U/L (ref 0–32)
ANION GAP SERPL CALCULATED.3IONS-SCNC: 14 MMOL/L (ref 7–16)
AST SERPL-CCNC: 16 U/L (ref 0–31)
BASOPHILS ABSOLUTE: 0.14 E9/L (ref 0–0.2)
BASOPHILS RELATIVE PERCENT: 1.5 % (ref 0–2)
BILIRUB SERPL-MCNC: 0.3 MG/DL (ref 0–1.2)
BUN BLDV-MCNC: 19 MG/DL (ref 6–20)
CALCIUM SERPL-MCNC: 9 MG/DL (ref 8.6–10.2)
CHLORIDE BLD-SCNC: 100 MMOL/L (ref 98–107)
CO2: 23 MMOL/L (ref 22–29)
CREAT SERPL-MCNC: 0.7 MG/DL (ref 0.5–1)
EOSINOPHILS ABSOLUTE: 0.93 E9/L (ref 0.05–0.5)
EOSINOPHILS RELATIVE PERCENT: 10.1 % (ref 0–6)
GFR AFRICAN AMERICAN: >60
GFR NON-AFRICAN AMERICAN: >60 ML/MIN/1.73
GLUCOSE BLD-MCNC: 78 MG/DL (ref 74–99)
HCT VFR BLD CALC: 38.3 % (ref 34–48)
HEMOGLOBIN: 12.2 G/DL (ref 11.5–15.5)
IMMATURE GRANULOCYTES #: 0.03 E9/L
IMMATURE GRANULOCYTES %: 0.3 % (ref 0–5)
LYMPHOCYTES ABSOLUTE: 3.24 E9/L (ref 1.5–4)
LYMPHOCYTES RELATIVE PERCENT: 35.2 % (ref 20–42)
MCH RBC QN AUTO: 27.9 PG (ref 26–35)
MCHC RBC AUTO-ENTMCNC: 31.9 % (ref 32–34.5)
MCV RBC AUTO: 87.4 FL (ref 80–99.9)
MONOCYTES ABSOLUTE: 0.69 E9/L (ref 0.1–0.95)
MONOCYTES RELATIVE PERCENT: 7.5 % (ref 2–12)
NEUTROPHILS ABSOLUTE: 4.17 E9/L (ref 1.8–7.3)
NEUTROPHILS RELATIVE PERCENT: 45.4 % (ref 43–80)
PDW BLD-RTO: 12.5 FL (ref 11.5–15)
PLATELET # BLD: 362 E9/L (ref 130–450)
PMV BLD AUTO: 10.6 FL (ref 7–12)
POTASSIUM REFLEX MAGNESIUM: 4.4 MMOL/L (ref 3.5–5)
RBC # BLD: 4.38 E12/L (ref 3.5–5.5)
SODIUM BLD-SCNC: 137 MMOL/L (ref 132–146)
TOTAL PROTEIN: 6.7 G/DL (ref 6.4–8.3)
TROPONIN, HIGH SENSITIVITY: <6 NG/L (ref 0–9)
TROPONIN, HIGH SENSITIVITY: <6 NG/L (ref 0–9)
WBC # BLD: 9.2 E9/L (ref 4.5–11.5)

## 2022-06-18 PROCEDURE — 70450 CT HEAD/BRAIN W/O DYE: CPT

## 2022-06-18 PROCEDURE — 2580000003 HC RX 258: Performed by: STUDENT IN AN ORGANIZED HEALTH CARE EDUCATION/TRAINING PROGRAM

## 2022-06-18 PROCEDURE — 6360000002 HC RX W HCPCS: Performed by: STUDENT IN AN ORGANIZED HEALTH CARE EDUCATION/TRAINING PROGRAM

## 2022-06-18 PROCEDURE — 96375 TX/PRO/DX INJ NEW DRUG ADDON: CPT

## 2022-06-18 PROCEDURE — 99285 EMERGENCY DEPT VISIT HI MDM: CPT

## 2022-06-18 PROCEDURE — 80053 COMPREHEN METABOLIC PANEL: CPT

## 2022-06-18 PROCEDURE — 36415 COLL VENOUS BLD VENIPUNCTURE: CPT

## 2022-06-18 PROCEDURE — 96374 THER/PROPH/DIAG INJ IV PUSH: CPT

## 2022-06-18 PROCEDURE — 84484 ASSAY OF TROPONIN QUANT: CPT

## 2022-06-18 PROCEDURE — 85025 COMPLETE CBC W/AUTO DIFF WBC: CPT

## 2022-06-18 PROCEDURE — 93005 ELECTROCARDIOGRAM TRACING: CPT | Performed by: STUDENT IN AN ORGANIZED HEALTH CARE EDUCATION/TRAINING PROGRAM

## 2022-06-18 PROCEDURE — 71045 X-RAY EXAM CHEST 1 VIEW: CPT

## 2022-06-18 RX ORDER — 0.9 % SODIUM CHLORIDE 0.9 %
1000 INTRAVENOUS SOLUTION INTRAVENOUS ONCE
Status: COMPLETED | OUTPATIENT
Start: 2022-06-18 | End: 2022-06-18

## 2022-06-18 RX ORDER — METOCLOPRAMIDE HYDROCHLORIDE 5 MG/ML
10 INJECTION INTRAMUSCULAR; INTRAVENOUS ONCE
Status: COMPLETED | OUTPATIENT
Start: 2022-06-18 | End: 2022-06-18

## 2022-06-18 RX ORDER — DIPHENHYDRAMINE HYDROCHLORIDE 50 MG/ML
25 INJECTION INTRAMUSCULAR; INTRAVENOUS ONCE
Status: COMPLETED | OUTPATIENT
Start: 2022-06-18 | End: 2022-06-18

## 2022-06-18 RX ADMIN — DIPHENHYDRAMINE HYDROCHLORIDE 25 MG: 50 INJECTION, SOLUTION INTRAMUSCULAR; INTRAVENOUS at 12:32

## 2022-06-18 RX ADMIN — SODIUM CHLORIDE 1000 ML: 9 INJECTION, SOLUTION INTRAVENOUS at 12:31

## 2022-06-18 RX ADMIN — METOCLOPRAMIDE HYDROCHLORIDE 10 MG: 5 INJECTION INTRAMUSCULAR; INTRAVENOUS at 12:32

## 2022-06-18 ASSESSMENT — PAIN DESCRIPTION - LOCATION: LOCATION: HEAD

## 2022-06-18 ASSESSMENT — PAIN DESCRIPTION - FREQUENCY: FREQUENCY: CONTINUOUS

## 2022-06-18 ASSESSMENT — PAIN DESCRIPTION - DESCRIPTORS: DESCRIPTORS: ACHING

## 2022-06-18 ASSESSMENT — PAIN SCALES - GENERAL: PAINLEVEL_OUTOF10: 8

## 2022-06-18 ASSESSMENT — PAIN - FUNCTIONAL ASSESSMENT: PAIN_FUNCTIONAL_ASSESSMENT: 0-10
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
## 2022-06-19 PROCEDURE — 85025 COMPLETE CBC W/AUTO DIFF WBC: CPT

## 2022-06-19 PROCEDURE — 99284 EMERGENCY DEPT VISIT MOD MDM: CPT

## 2022-06-19 PROCEDURE — 84484 ASSAY OF TROPONIN QUANT: CPT

## 2022-06-19 PROCEDURE — 80053 COMPREHEN METABOLIC PANEL: CPT

## 2022-06-19 PROCEDURE — 93005 ELECTROCARDIOGRAM TRACING: CPT | Performed by: PHYSICIAN ASSISTANT

## 2022-06-20 ENCOUNTER — APPOINTMENT (OUTPATIENT)
Dept: CT IMAGING | Age: 37
End: 2022-06-20
Payer: COMMERCIAL

## 2022-06-20 ENCOUNTER — HOSPITAL ENCOUNTER (EMERGENCY)
Age: 37
Discharge: HOME | End: 2022-06-20
Attending: EMERGENCY MEDICINE
Payer: COMMERCIAL

## 2022-06-20 VITALS
HEART RATE: 87 BPM | SYSTOLIC BLOOD PRESSURE: 148 MMHG | TEMPERATURE: 98.7 F | OXYGEN SATURATION: 97 % | BODY MASS INDEX: 20.4 KG/M2 | RESPIRATION RATE: 17 BRPM | WEIGHT: 130 LBS | DIASTOLIC BLOOD PRESSURE: 92 MMHG | HEIGHT: 67 IN

## 2022-06-20 DIAGNOSIS — I15.9 SECONDARY HYPERTENSION: Primary | ICD-10-CM

## 2022-06-20 LAB
ALBUMIN SERPL-MCNC: 4.5 G/DL (ref 3.5–5.2)
ALP BLD-CCNC: 89 U/L (ref 35–104)
ALT SERPL-CCNC: 25 U/L (ref 0–32)
ANION GAP SERPL CALCULATED.3IONS-SCNC: 14 MMOL/L (ref 7–16)
AST SERPL-CCNC: 27 U/L (ref 0–31)
BASOPHILS ABSOLUTE: 0.16 E9/L (ref 0–0.2)
BASOPHILS RELATIVE PERCENT: 1.5 % (ref 0–2)
BILIRUB SERPL-MCNC: <0.2 MG/DL (ref 0–1.2)
BUN BLDV-MCNC: 22 MG/DL (ref 6–20)
CALCIUM SERPL-MCNC: 9.1 MG/DL (ref 8.6–10.2)
CHLORIDE BLD-SCNC: 100 MMOL/L (ref 98–107)
CO2: 24 MMOL/L (ref 22–29)
CREAT SERPL-MCNC: 0.8 MG/DL (ref 0.5–1)
EKG ATRIAL RATE: 57 BPM
EKG ATRIAL RATE: 91 BPM
EKG P AXIS: 49 DEGREES
EKG P AXIS: 54 DEGREES
EKG P-R INTERVAL: 128 MS
EKG P-R INTERVAL: 128 MS
EKG Q-T INTERVAL: 390 MS
EKG Q-T INTERVAL: 454 MS
EKG QRS DURATION: 108 MS
EKG QRS DURATION: 94 MS
EKG QTC CALCULATION (BAZETT): 441 MS
EKG QTC CALCULATION (BAZETT): 479 MS
EKG R AXIS: 69 DEGREES
EKG R AXIS: 85 DEGREES
EKG T AXIS: 84 DEGREES
EKG T AXIS: 89 DEGREES
EKG VENTRICULAR RATE: 57 BPM
EKG VENTRICULAR RATE: 91 BPM
EOSINOPHILS ABSOLUTE: 0.97 E9/L (ref 0.05–0.5)
EOSINOPHILS RELATIVE PERCENT: 9.2 % (ref 0–6)
GFR AFRICAN AMERICAN: >60
GFR NON-AFRICAN AMERICAN: >60 ML/MIN/1.73
GLUCOSE BLD-MCNC: 98 MG/DL (ref 74–99)
HCT VFR BLD CALC: 38.7 % (ref 34–48)
HEMOGLOBIN: 12 G/DL (ref 11.5–15.5)
IMMATURE GRANULOCYTES #: 0.04 E9/L
IMMATURE GRANULOCYTES %: 0.4 % (ref 0–5)
LYMPHOCYTES ABSOLUTE: 3.57 E9/L (ref 1.5–4)
LYMPHOCYTES RELATIVE PERCENT: 33.7 % (ref 20–42)
MCH RBC QN AUTO: 27.7 PG (ref 26–35)
MCHC RBC AUTO-ENTMCNC: 31 % (ref 32–34.5)
MCV RBC AUTO: 89.4 FL (ref 80–99.9)
MONOCYTES ABSOLUTE: 0.96 E9/L (ref 0.1–0.95)
MONOCYTES RELATIVE PERCENT: 9.1 % (ref 2–12)
NEUTROPHILS ABSOLUTE: 4.89 E9/L (ref 1.8–7.3)
NEUTROPHILS RELATIVE PERCENT: 46.1 % (ref 43–80)
PDW BLD-RTO: 12.5 FL (ref 11.5–15)
PLATELET # BLD: 386 E9/L (ref 130–450)
PMV BLD AUTO: 10.6 FL (ref 7–12)
POTASSIUM REFLEX MAGNESIUM: 4.1 MMOL/L (ref 3.5–5)
RBC # BLD: 4.33 E12/L (ref 3.5–5.5)
SODIUM BLD-SCNC: 138 MMOL/L (ref 132–146)
TOTAL PROTEIN: 7.3 G/DL (ref 6.4–8.3)
TROPONIN, HIGH SENSITIVITY: <6 NG/L (ref 0–9)
WBC # BLD: 10.6 E9/L (ref 4.5–11.5)

## 2022-06-20 PROCEDURE — 70450 CT HEAD/BRAIN W/O DYE: CPT
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
## 2022-06-20 NOTE — ED NOTES
Department of Emergency Medicine  FIRST PROVIDER TRIAGE NOTE             Independent MLP           6/19/22  11:36 PM EDT    Date of Encounter: 6/19/22   MRN: [id number]      HPI: Tiara Hinton is a 44 y.o. female who presents to the ED for Hypertension (pt to ED stating her BP has been elevated x3 days. Pt states she was here 2 days ago and eloped)   Patient comes in with  elevated bp 192/126 prior to arrival she had complaint of headache spots in her vision . She has hx htn medications recently changed     ROS: Negative for cp or sob. PE: Gen Appearance/Constitutional: alert  CV: regular rate  Pulm: CTA bilat     Initial Plan of Care: All treatment areas with department are currently occupied. Plan to order/Initiate the following while awaiting opening in ED: labs, EKG and imaging studies.   Initiate Treatment-Testing, Proceed toTreatment Area When Bed Available for ED Attending/MLP to Continue Care    Electronically signed by ALEX Capone   DD: 6/19/22       ALEX Capone  06/19/22 [id number]

## 2022-06-21 LAB
EKG ATRIAL RATE: 68 BPM
EKG P AXIS: 56 DEGREES
EKG P-R INTERVAL: 136 MS
EKG Q-T INTERVAL: 444 MS
EKG QRS DURATION: 102 MS
EKG QTC CALCULATION (BAZETT): 472 MS
EKG R AXIS: 87 DEGREES
EKG T AXIS: 91 DEGREES
EKG VENTRICULAR RATE: 68 BPM

## 2022-07-08 ENCOUNTER — OFFICE VISIT (OUTPATIENT)
Dept: CARDIOLOGY CLINIC | Age: 37
End: 2022-07-08
Payer: COMMERCIAL

## 2022-07-08 VITALS
RESPIRATION RATE: 16 BRPM | HEART RATE: 72 BPM | WEIGHT: 135 LBS | BODY MASS INDEX: 21.19 KG/M2 | DIASTOLIC BLOOD PRESSURE: 80 MMHG | HEIGHT: 67 IN | SYSTOLIC BLOOD PRESSURE: 120 MMHG

## 2022-07-08 DIAGNOSIS — I10 PRIMARY HYPERTENSION: Primary | ICD-10-CM

## 2022-07-08 PROCEDURE — 93000 ELECTROCARDIOGRAM COMPLETE: CPT | Performed by: INTERNAL MEDICINE

## 2022-07-08 PROCEDURE — 99204 OFFICE O/P NEW MOD 45 MIN: CPT | Performed by: INTERNAL MEDICINE

## 2022-07-08 PROCEDURE — G8420 CALC BMI NORM PARAMETERS: HCPCS | Performed by: INTERNAL MEDICINE

## 2022-07-08 PROCEDURE — G8427 DOCREV CUR MEDS BY ELIG CLIN: HCPCS | Performed by: INTERNAL MEDICINE

## 2022-07-08 PROCEDURE — 4004F PT TOBACCO SCREEN RCVD TLK: CPT | Performed by: INTERNAL MEDICINE

## 2022-07-08 RX ORDER — LORATADINE 10 MG/1
10 TABLET ORAL DAILY
COMMUNITY
Start: 2022-07-05

## 2022-07-08 RX ORDER — GABAPENTIN 100 MG/1
100 CAPSULE ORAL 3 TIMES DAILY
COMMUNITY
Start: 2022-07-05

## 2022-07-08 RX ORDER — BUSPIRONE HYDROCHLORIDE 5 MG/1
5 TABLET ORAL 3 TIMES DAILY
COMMUNITY
Start: 2022-07-07

## 2022-07-08 RX ORDER — AMLODIPINE BESYLATE 10 MG/1
10 TABLET ORAL DAILY
Qty: 90 TABLET | Refills: 1 | Status: SHIPPED | OUTPATIENT
Start: 2022-07-08

## 2022-07-08 RX ORDER — MIRTAZAPINE 7.5 MG/1
7.5 TABLET, FILM COATED ORAL DAILY
COMMUNITY
Start: 2022-07-07

## 2022-07-08 RX ORDER — BUPROPION HYDROCHLORIDE 150 MG/1
150 TABLET ORAL DAILY
COMMUNITY
Start: 2022-07-07

## 2022-07-08 RX ORDER — CLONIDINE HYDROCHLORIDE 0.2 MG/1
0.2 TABLET ORAL 2 TIMES DAILY
Qty: 120 TABLET | Refills: 1 | Status: SHIPPED
Start: 2022-07-08 | End: 2022-07-08
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
## 2022-11-27 ENCOUNTER — HOSPITAL ENCOUNTER (EMERGENCY)
Age: 37
Discharge: HOME | End: 2022-11-27
Attending: EMERGENCY MEDICINE
Payer: COMMERCIAL

## 2022-11-27 VITALS
TEMPERATURE: 99.3 F | SYSTOLIC BLOOD PRESSURE: 155 MMHG | OXYGEN SATURATION: 99 % | DIASTOLIC BLOOD PRESSURE: 100 MMHG | WEIGHT: 150 LBS | HEART RATE: 65 BPM | BODY MASS INDEX: 24.11 KG/M2 | RESPIRATION RATE: 16 BRPM | HEIGHT: 66 IN

## 2022-11-27 DIAGNOSIS — L30.9 ECZEMA, UNSPECIFIED TYPE: ICD-10-CM

## 2022-11-27 DIAGNOSIS — K04.7 DENTAL INFECTION: ICD-10-CM

## 2022-11-27 DIAGNOSIS — K08.89 PAIN, DENTAL: Primary | ICD-10-CM

## 2022-11-27 PROCEDURE — 99284 EMERGENCY DEPT VISIT MOD MDM: CPT

## 2022-11-27 PROCEDURE — 6360000002 HC RX W HCPCS: Performed by: EMERGENCY MEDICINE

## 2022-11-27 PROCEDURE — 6370000000 HC RX 637 (ALT 250 FOR IP): Performed by: EMERGENCY MEDICINE

## 2022-11-27 PROCEDURE — 96372 THER/PROPH/DIAG INJ SC/IM: CPT

## 2022-11-27 RX ORDER — CLONIDINE 0.1 MG/24H
1 PATCH, EXTENDED RELEASE TRANSDERMAL WEEKLY
COMMUNITY

## 2022-11-27 RX ORDER — PENICILLIN V POTASSIUM 250 MG/1
500 TABLET ORAL ONCE
Status: COMPLETED | OUTPATIENT
Start: 2022-11-27 | End: 2022-11-27

## 2022-11-27 RX ORDER — KETOROLAC TROMETHAMINE 30 MG/ML
30 INJECTION, SOLUTION INTRAMUSCULAR; INTRAVENOUS ONCE
Status: COMPLETED | OUTPATIENT
Start: 2022-11-27 | End: 2022-11-27

## 2022-11-27 RX ORDER — VIT E ACET/GLY/DIMETH/WATER
LOTION (ML) TOPICAL
Qty: 237 ML | Refills: 1 | Status: SHIPPED | OUTPATIENT
Start: 2022-11-27

## 2022-11-27 RX ORDER — PENICILLIN V POTASSIUM 500 MG/1
500 TABLET ORAL 4 TIMES DAILY
Qty: 28 TABLET | Refills: 0 | Status: SHIPPED | OUTPATIENT
Start: 2022-11-27 | End: 2022-12-04

## 2022-11-27 RX ORDER — HYDROCODONE BITARTRATE AND ACETAMINOPHEN 5; 325 MG/1; MG/1
1 TABLET ORAL ONCE
Status: DISCONTINUED | OUTPATIENT
Start: 2022-11-27 | End: 2022-11-27

## 2022-11-27 RX ADMIN — PENICILLIN V POTASSIUM 500 MG: 250 TABLET, FILM COATED ORAL at 22:42

## 2022-11-27 RX ADMIN — KETOROLAC TROMETHAMINE 30 MG: 30 INJECTION, SOLUTION INTRAMUSCULAR at 22:47

## 2022-11-27 ASSESSMENT — PAIN DESCRIPTION - ORIENTATION: ORIENTATION: LEFT;LOWER

## 2022-11-27 ASSESSMENT — PAIN DESCRIPTION - FREQUENCY: FREQUENCY: CONTINUOUS

## 2022-11-27 ASSESSMENT — PAIN SCALES - GENERAL: PAINLEVEL_OUTOF10: 8

## 2022-11-27 ASSESSMENT — PAIN DESCRIPTION - PAIN TYPE: TYPE: ACUTE PAIN

## 2022-11-27 ASSESSMENT — LIFESTYLE VARIABLES
HOW MANY STANDARD DRINKS CONTAINING ALCOHOL DO YOU HAVE ON A TYPICAL DAY: PATIENT DOES NOT DRINK
HOW OFTEN DO YOU HAVE A DRINK CONTAINING ALCOHOL: NEVER

## 2022-11-27 ASSESSMENT — PAIN - FUNCTIONAL ASSESSMENT
PAIN_FUNCTIONAL_ASSESSMENT: ACTIVITIES ARE NOT PREVENTED
PAIN_FUNCTIONAL_ASSESSMENT: 0-10

## 2022-11-27 ASSESSMENT — PAIN DESCRIPTION - ONSET: ONSET: ON-GOING

## 2022-11-27 ASSESSMENT — PAIN DESCRIPTION - DESCRIPTORS: DESCRIPTORS: ACHING

## 2022-11-27 ASSESSMENT — PAIN DESCRIPTION - LOCATION: LOCATION: TEETH
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
## 2023-01-09 ENCOUNTER — HOSPITAL ENCOUNTER (EMERGENCY)
Age: 38
Discharge: HOME | End: 2023-01-09
Attending: EMERGENCY MEDICINE
Payer: COMMERCIAL

## 2023-01-09 VITALS
OXYGEN SATURATION: 99 % | HEART RATE: 88 BPM | BODY MASS INDEX: 23.54 KG/M2 | SYSTOLIC BLOOD PRESSURE: 143 MMHG | TEMPERATURE: 98.9 F | HEIGHT: 67 IN | DIASTOLIC BLOOD PRESSURE: 75 MMHG | RESPIRATION RATE: 16 BRPM | WEIGHT: 150 LBS

## 2023-01-09 DIAGNOSIS — H92.02 OTALGIA, LEFT EAR: ICD-10-CM

## 2023-01-09 DIAGNOSIS — K08.89 PAIN, DENTAL: Primary | ICD-10-CM

## 2023-01-09 PROCEDURE — 6370000000 HC RX 637 (ALT 250 FOR IP)

## 2023-01-09 PROCEDURE — 99283 EMERGENCY DEPT VISIT LOW MDM: CPT

## 2023-01-09 RX ORDER — AMOXICILLIN AND CLAVULANATE POTASSIUM 875; 125 MG/1; MG/1
1 TABLET, FILM COATED ORAL 2 TIMES DAILY
Qty: 14 TABLET | Refills: 0 | Status: SHIPPED | OUTPATIENT
Start: 2023-01-09 | End: 2023-01-16

## 2023-01-09 RX ORDER — IBUPROFEN 600 MG/1
600 TABLET ORAL ONCE
Status: COMPLETED | OUTPATIENT
Start: 2023-01-09 | End: 2023-01-09

## 2023-01-09 RX ORDER — AMOXICILLIN AND CLAVULANATE POTASSIUM 875; 125 MG/1; MG/1
1 TABLET, FILM COATED ORAL ONCE
Status: COMPLETED | OUTPATIENT
Start: 2023-01-09 | End: 2023-01-09

## 2023-01-09 RX ADMIN — IBUPROFEN 600 MG: 600 TABLET, FILM COATED ORAL at 01:13

## 2023-01-09 RX ADMIN — AMOXICILLIN AND CLAVULANATE POTASSIUM 1 TABLET: 875; 125 TABLET, FILM COATED ORAL at 01:13

## 2023-01-09 ASSESSMENT — PAIN DESCRIPTION - DESCRIPTORS: DESCRIPTORS_2: DISCOMFORT;ACHING;PRESSURE

## 2023-01-09 ASSESSMENT — PAIN DESCRIPTION - PAIN TYPE: TYPE: ACUTE PAIN

## 2023-01-09 ASSESSMENT — PAIN DESCRIPTION - LOCATION
LOCATION: HEAD
LOCATION_2: EAR

## 2023-01-09 ASSESSMENT — PAIN DESCRIPTION - ORIENTATION: ORIENTATION_2: LEFT

## 2023-01-09 ASSESSMENT — PAIN DESCRIPTION - INTENSITY: RATING_2: 4

## 2023-01-09 ASSESSMENT — PAIN - FUNCTIONAL ASSESSMENT: PAIN_FUNCTIONAL_ASSESSMENT: 0-10

## 2023-01-09 ASSESSMENT — PAIN SCALES - GENERAL: PAINLEVEL_OUTOF10: 9
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
## 2023-01-09 NOTE — LETTER
[address] [facility] Emergency Department  [address] [facility] [address]  Phone: [phone]             January 9, 2023    Patient: Eduardo Vyas   YOB: 1985   Date of Visit: 1/9/2023       To Whom It May Concern:    Vinnie Chris was seen and treated in our emergency department on 1/9/2023. Diagnosed with dental/ear infection. Symptoms are not related to Covid.       Sincerely,             Signature:__________________________________

## 2023-02-18 ENCOUNTER — HOSPITAL ENCOUNTER (EMERGENCY)
Age: 38
Discharge: HOME | End: 2023-02-18
Payer: COMMERCIAL

## 2023-02-18 VITALS
TEMPERATURE: 99.2 F | RESPIRATION RATE: 16 BRPM | SYSTOLIC BLOOD PRESSURE: 148 MMHG | BODY MASS INDEX: 23.85 KG/M2 | DIASTOLIC BLOOD PRESSURE: 95 MMHG | OXYGEN SATURATION: 98 % | WEIGHT: 150 LBS | HEART RATE: 74 BPM

## 2023-02-18 DIAGNOSIS — H60.543 ECZEMA OF BOTH EXTERNAL EARS: ICD-10-CM

## 2023-02-18 DIAGNOSIS — H71.91 CHOLESTEATOMA OF RIGHT EAR: Primary | ICD-10-CM

## 2023-02-18 PROCEDURE — 99283 EMERGENCY DEPT VISIT LOW MDM: CPT

## 2023-02-18 RX ORDER — CEFDINIR 300 MG/1
300 CAPSULE ORAL 2 TIMES DAILY
Qty: 20 CAPSULE | Refills: 0 | Status: SHIPPED | OUTPATIENT
Start: 2023-02-18 | End: 2023-02-28

## 2023-02-18 ASSESSMENT — PAIN SCALES - GENERAL: PAINLEVEL_OUTOF10: 10

## 2023-02-18 ASSESSMENT — PAIN DESCRIPTION - ONSET: ONSET: ON-GOING

## 2023-02-18 ASSESSMENT — PAIN DESCRIPTION - LOCATION: LOCATION: EAR

## 2023-02-18 ASSESSMENT — PAIN - FUNCTIONAL ASSESSMENT: PAIN_FUNCTIONAL_ASSESSMENT: 0-10

## 2023-02-18 ASSESSMENT — PAIN DESCRIPTION - ORIENTATION: ORIENTATION: RIGHT

## 2023-02-18 ASSESSMENT — PAIN DESCRIPTION - PAIN TYPE: TYPE: ACUTE PAIN

## 2023-02-18 ASSESSMENT — PAIN DESCRIPTION - FREQUENCY: FREQUENCY: CONTINUOUS

## 2023-02-18 ASSESSMENT — PAIN DESCRIPTION - DESCRIPTORS: DESCRIPTORS: DISCOMFORT;SORE

## 2023-02-18 NOTE — Clinical Note
John Cheung was seen and treated in our emergency department on 2/18/2023. She may return to work on 02/20/2023. If you have any questions or concerns, please don't hesitate to call.       Rasheed Flores, CHALO - CNP

## 2023-02-18 NOTE — DISCHARGE INSTRUCTIONS
Please follow-up with ENT, from what I can see of your eardrum it does appear that you have a cholesteatoma, this is likely due to recurrent infections however I would like you to be seen by ENT, they do have a better tools to evaluate the entire eardrum, you will be started on Omnicef twice daily for the next 10 days, you may continue use the hydrocortisone for the eczema of the ears.
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
## 2023-02-22 ENCOUNTER — TELEPHONE (OUTPATIENT)
Dept: ENT CLINIC | Age: 38
End: 2023-02-22

## 2023-02-22 NOTE — TELEPHONE ENCOUNTER
Pt called in to [facility] an ED F/U for Cholesteatoma of right ear. Palmira Calderonnakul can be reached at [phone]. Thank you.

## 2023-04-21 ENCOUNTER — PROCEDURE VISIT (OUTPATIENT)
Dept: AUDIOLOGY | Age: 38
End: 2023-04-21

## 2023-04-21 ENCOUNTER — OFFICE VISIT (OUTPATIENT)
Dept: ENT CLINIC | Age: 38
End: 2023-04-21
Payer: COMMERCIAL

## 2023-04-21 ENCOUNTER — TELEPHONE (OUTPATIENT)
Dept: ENT CLINIC | Age: 38
End: 2023-04-21

## 2023-04-21 VITALS — WEIGHT: 150 LBS | HEIGHT: 67 IN | BODY MASS INDEX: 23.54 KG/M2

## 2023-04-21 DIAGNOSIS — H91.93 DECREASED HEARING OF BOTH EARS: ICD-10-CM

## 2023-04-21 DIAGNOSIS — H71.91 CHOLESTEATOMA OF RIGHT EAR: Primary | ICD-10-CM

## 2023-04-21 DIAGNOSIS — H71.91 CHOLESTEATOMA OF EAR, RIGHT: Primary | ICD-10-CM

## 2023-04-21 DIAGNOSIS — H92.13 OTORRHEA OF BOTH EARS: ICD-10-CM

## 2023-04-21 DIAGNOSIS — H61.23 BILATERAL IMPACTED CERUMEN: ICD-10-CM

## 2023-04-21 PROCEDURE — G8427 DOCREV CUR MEDS BY ELIG CLIN: HCPCS | Performed by: OTOLARYNGOLOGY

## 2023-04-21 PROCEDURE — 69210 REMOVE IMPACTED EAR WAX UNI: CPT | Performed by: OTOLARYNGOLOGY

## 2023-04-21 PROCEDURE — 99204 OFFICE O/P NEW MOD 45 MIN: CPT | Performed by: OTOLARYNGOLOGY

## 2023-04-21 PROCEDURE — 4004F PT TOBACCO SCREEN RCVD TLK: CPT | Performed by: OTOLARYNGOLOGY

## 2023-04-21 PROCEDURE — G8420 CALC BMI NORM PARAMETERS: HCPCS | Performed by: OTOLARYNGOLOGY

## 2023-04-21 ASSESSMENT — ENCOUNTER SYMPTOMS
ALLERGIC/IMMUNOLOGIC NEGATIVE: 1
RESPIRATORY NEGATIVE: 1
EYES NEGATIVE: 1

## 2023-04-21 NOTE — PROGRESS NOTES
This patient was referred for audiometric/tympanometric testing by Dr. Jennifer Vadles due to possible right cholesteatoma and decrease hearing loss bilaterally post cerumen removal.      Audiometry using pure tone air and bone conduction revealed essentially normal hearing sensitivity bilaterally. Reliability was good. Speech reception thresholds were in good agreement with the pure tone averages, bilaterally. Speech discrimination scores were excellent, bilaterally. Tympanometry revealed normal middle ear peak pressure and compliance, in the right ear. Left ear could not maintain a seal     The results were reviewed with the patient and physician. Recommendations for follow up will be made pending physician consult.     Carlita Martinez/[facility]  [place] Lic # [id number]

## 2023-04-21 NOTE — TELEPHONE ENCOUNTER
[facility] to authorize order with patient insurance. Patient is scheduled for CT IAC POSTERIOR FOSSA WITHOUT CONTRAST with radiology on 05/30/23 @ 12PM. Patient has been notified of date and time and that they need to arrive at 11:30AM. Patient was informed SHE HAS NO PREP prior to procedure. Patient instructed to park in [facility] parking lot and report to registration. DETAILED VOICEMAIL LEFT FOR PATIENT.   Electronically signed by Peter Pool LPN on 7/05/7468 at 4:38 PM

## 2023-05-30 ENCOUNTER — HOSPITAL ENCOUNTER (OUTPATIENT)
Dept: CT IMAGING | Age: 38
Discharge: HOME | End: 2023-06-01
Payer: COMMERCIAL

## 2023-05-30 DIAGNOSIS — H71.91 CHOLESTEATOMA OF RIGHT EAR: ICD-10-CM

## 2023-05-30 DIAGNOSIS — H92.13 OTORRHEA OF BOTH EARS: ICD-10-CM

## 2023-05-30 PROCEDURE — 70480 CT ORBIT/EAR/FOSSA W/O DYE: CPT

## 2024-01-18 RX ORDER — CLONIDINE HYDROCHLORIDE 0.2 MG/1
0.2 TABLET ORAL 2 TIMES DAILY
Qty: 120 TABLET | Refills: 1 | OUTPATIENT
Start: 2024-01-18

## 2024-05-30 NOTE — CARE COORDINATION
Re: Missing Information  Dear Dr. Colbert and office staff, we are missing information from your office on patient Jodie Collado, MRN: [id number], : 1987. We are unable to optimize your patient for surgery without the information listed below.    Date of surgery: 5/10/2024  Surgeon(s):  Kathy Colbert MD  Procedure(s):  LAPAROSCOPIC BILATERAL SALPINGECTOMY    Please fax the following missing items as noted below:      [x] Surgical Consult  [x] Other -- STERILIZATION CONSENT    Please fax back as soon as possible to [phone]. If you have any questions, please contact the [facility] Pre-Surgical Testing Department at [phone] as soon as possible to avoid any delay in service for your patient.    [facility]  Pre-Surgical Testing Department  (P) [phone]  (F) [phone]    [facility] Order Requirements state:  ALL ORDERS MUST BE DATED, LEGIBLE AND CONTAIN:  Full Patient Legal Name (as appears on ’s license)  Patient’s Date of Birth (as appears on ’s license)  Pre-admission testing as per anesthesia guidelines  Diagnosis and procedural consent.  (No spelling errors or abbreviations)  Physician/provider name  Physician/Provider CMS Approved Signature    All documentation (ie. History and Physical, labs) MUST contain name and date of birth on EACH page.    Thank you for helping us provide you and your patient with the best possible experience!   This note will not be viewable in [employer] for the following reason(s). [place]: Unable to separate mother vs.  [address]     Discharge Planning     SW spoke with Suma Rangel from [facility] who reported that she had been in touch with Gloria Carlos. Amy Garcia stated that she is currently working with Gloria Carlos to schedule an appointment with her at [facility] once discharged. Amy Garcia reported that if any issues arise she will inform SW      Baby was transferred to NICU, so NICU SW, Myles Edward, will take over with family until family is discharged. SW provided [facility] with a verbal update. PLAN  Baby can NOT be discharged home until [facility] ( [phone]) provides disposition  SW to continue communication with nursing staff and [facility] ( [phone])     Mother is currently working with [facility] to set up and appointment. NICU to take over family.     Electronically signed by LENNOX Ocampo on 2021 at 8:23 AM
